# Patient Record
Sex: FEMALE | Race: WHITE | Employment: FULL TIME | URBAN - METROPOLITAN AREA
[De-identification: names, ages, dates, MRNs, and addresses within clinical notes are randomized per-mention and may not be internally consistent; named-entity substitution may affect disease eponyms.]

---

## 2019-10-15 ENCOUNTER — PROCEDURE VISIT (OUTPATIENT)
Dept: AUDIOLOGY | Age: 54
End: 2019-10-15

## 2019-10-15 DIAGNOSIS — H91.92 UNILATERAL HEARING LOSS, LEFT: Primary | ICD-10-CM

## 2019-10-15 DIAGNOSIS — H93.12 TINNITUS, LEFT: ICD-10-CM

## 2019-10-15 PROCEDURE — 99999 PR OFFICE/OUTPT VISIT,PROCEDURE ONLY: CPT | Performed by: AUDIOLOGIST

## 2020-02-03 ENCOUNTER — PROCEDURE VISIT (OUTPATIENT)
Dept: AUDIOLOGY | Age: 55
End: 2020-02-03
Payer: COMMERCIAL

## 2020-02-03 PROCEDURE — 92557 COMPREHENSIVE HEARING TEST: CPT | Performed by: AUDIOLOGIST

## 2020-02-03 PROCEDURE — 92567 TYMPANOMETRY: CPT | Performed by: AUDIOLOGIST

## 2020-02-18 ENCOUNTER — OFFICE VISIT (OUTPATIENT)
Dept: ENT CLINIC | Age: 55
End: 2020-02-18
Payer: COMMERCIAL

## 2020-02-18 VITALS — BODY MASS INDEX: 33.29 KG/M2 | WEIGHT: 195 LBS | HEIGHT: 64 IN

## 2020-02-18 PROCEDURE — G8427 DOCREV CUR MEDS BY ELIG CLIN: HCPCS | Performed by: OTOLARYNGOLOGY

## 2020-02-18 PROCEDURE — G8417 CALC BMI ABV UP PARAM F/U: HCPCS | Performed by: OTOLARYNGOLOGY

## 2020-02-18 PROCEDURE — 3017F COLORECTAL CA SCREEN DOC REV: CPT | Performed by: OTOLARYNGOLOGY

## 2020-02-18 PROCEDURE — 99203 OFFICE O/P NEW LOW 30 MIN: CPT | Performed by: OTOLARYNGOLOGY

## 2020-02-18 PROCEDURE — G8484 FLU IMMUNIZE NO ADMIN: HCPCS | Performed by: OTOLARYNGOLOGY

## 2020-02-18 PROCEDURE — 1036F TOBACCO NON-USER: CPT | Performed by: OTOLARYNGOLOGY

## 2020-02-18 RX ORDER — CHLORTHALIDONE 25 MG/1
TABLET ORAL
COMMUNITY
Start: 2020-01-25

## 2020-02-18 RX ORDER — NIFEDIPINE 30 MG/1
TABLET, EXTENDED RELEASE ORAL
COMMUNITY
Start: 2020-01-25

## 2020-02-18 RX ORDER — ATENOLOL 50 MG/1
50 TABLET ORAL
COMMUNITY
Start: 2019-11-25

## 2020-02-18 NOTE — PROGRESS NOTES
DEPARTMENT OF OTOLARYNGOLOGY   HISTORY AND PHYSICAL      PATIENT: Yohan Client : 1965 (53 y.o.)   DATE: 2020  REFERRED BY: No referring provider defined for this encounter. HISTORY OBTAINED FROM:  patient    CHIEF COMPLAINT:  had concerns including Hearing Problem (hearing problems; left for 5 years wore hearing aids; right side hearing couple of months not sure if related to 3 week headache; had ct done at [facility] was negative). HISTORY OF PRESENT ILLNESS:                                                                                        Yohan Client is a(n) 53 y.o. female with significant past medical history of left hearing loss presents to the office today as a new patient for evaluation of her right hearing loss. Pt states she has old left sided hearing loss was 5 years , already has hearing aid for this. States it began as a head cold and notes that the sound was muffled in that ear, was later told she had permanent hearing loss from this. Prior patient of a Dr. Mahi Epstein. Pt was taking an annual hearing test and noted that she had new hearing loss in the right side. Pt reports no bleeding, drainage or dizziness associated with this hearing loss. States she uses q tips to clean regularly. Beginning 1 month ago began having headaches intermittently. Described as sharp, localized from the base of the neck to the top of her head. Went to ED in January for this and CT scan was unremarkable. Nausea without vomiting, also noted scotoma in the periphery of her vision.       Past Medical History:   Diagnosis Date    Hypertension         Past Surgical History:   Procedure Laterality Date    APPENDECTOMY       SECTION           Current Outpatient Medications:     NIFEdipine (PROCARDIA XL) 30 MG extended release tablet, , Disp: , Rfl:     atenolol (TENORMIN) 50 MG tablet, Take 50 mg by mouth, Disp: , Rfl:     chlorthalidone (HYGROTON) 25 MG tablet, , Disp: , Rfl:     VENTOLIN  (90 Base) MCG/ACT inhaler, TAKE 2 PUFFS BY MOUTH EVERY 4 TO 6 HOURS AS NEEDED, Disp: , Rfl:     Allergies   Allergen Reactions    Edarbyclor [Azilsartan-Chlorthalidone]      Cant breathe    Lasix [Furosemide] Anaphylaxis       Family History   Problem Relation Age of Onset    High Blood Pressure Mother     High Cholesterol Mother     High Blood Pressure Father     High Blood Pressure Sister        Social History     Socioeconomic History    Marital status:      Spouse name: Not on file    Number of children: Not on file    Years of education: Not on file    Highest education level: Not on file   Occupational History    Not on file   Social Needs    Financial resource strain: Not on file    Food insecurity:     Worry: Not on file     Inability: Not on file    Transportation needs:     Medical: Not on file     Non-medical: Not on file   Tobacco Use    Smoking status: Never Smoker    Smokeless tobacco: Never Used   Substance and Sexual Activity    Alcohol use: Yes     Alcohol/week: 0.0 standard drinks     Comment: occasionally    Drug use: No    Sexual activity: Not on file   Lifestyle    Physical activity:     Days per week: Not on file     Minutes per session: Not on file    Stress: Not on file   Relationships    Social connections:     Talks on phone: Not on file     Gets together: Not on file     Attends Worship service: Not on file     Active member of club or organization: Not on file     Attends meetings of clubs or organizations: Not on file     Relationship status: Not on file    Intimate partner violence:     Fear of current or ex partner: Not on file     Emotionally abused: Not on file     Physically abused: Not on file     Forced sexual activity: Not on file   Other Topics Concern    Not on file   Social History Narrative    Not on file       REVIEW OF SYSTEMS:  Review of Systems   Constitutional: Negative for chills and fever. HENT: Negative for ear discharge and hearing loss. Respiratory: Negative for cough and shortness of breath. Cardiovascular: Negative for chest pain and palpitations. Gastrointestinal: Negative for vomiting. Skin: Negative for rash. Allergic/Immunologic: Negative for environmental allergies. Neurological: Negative for dizziness and headaches. Hematological: Does not bruise/bleed easily. All other systems reviewed and are negative. Constitutional: Negative for chills and fever. Eyes: Negative for discharge and itching. ENT: Negative for congestion, ear discharge, ear pain, hearing loss and sore throat. Respiratory: Negative for chest tightness and shortness of breath. Cardiovascular: Negative for chestpain and palpitations. Gastrointestinal: Negative for abdominal distention and abdominal pain. Endocrinology: Negative for heat and cold intolerance. Neurological: Negative for focal weaknessor seizure   Skin: Negative for rash and itchiness   Psychiatric: Negative for depression and hallucinations     PHYSICAL EXAM:                                                                                                                Ht 5' 4\" (1.626 m)   Wt 195 lb (88.5 kg)   LMP 02/18/2020   Breastfeeding No   BMI 33.47 kg/m²   Physical Exam  Vitals signs and nursing note reviewed. Constitutional:       Appearance: She is well-developed. HENT:      Head: Normocephalic and atraumatic. Right Ear: Tympanic membrane normal.      Left Ear: Tympanic membrane normal.      Nose: No congestion or rhinorrhea. Eyes:      Conjunctiva/sclera: Conjunctivae normal.      Pupils: Pupils are equal, round, and reactive to light. Neck:      Musculoskeletal: Normal range of motion and neck supple. Cardiovascular:      Rate and Rhythm: Normal rate. Pulmonary:      Effort: Pulmonary effort is normal. No respiratory distress. Abdominal:      General: There is no distension. Manuela  1965      I have discussed the case, including pertinent history and exam findings with the resident. I have seen and examined the patient and the key elements of the encounter have been performed by me. I agree with the assessment, plan and orders as documented by the resident. Patient here for follow up of medical problems. Remainder of medical problems as per resident note.       [facility], DO  2/19/20

## 2020-02-24 ASSESSMENT — ENCOUNTER SYMPTOMS
COUGH: 0
SHORTNESS OF BREATH: 0
VOMITING: 0

## 2020-02-28 ENCOUNTER — HOSPITAL ENCOUNTER (OUTPATIENT)
Dept: MRI IMAGING | Age: 55
Discharge: HOME | End: 2020-03-01
Payer: COMMERCIAL

## 2020-02-28 PROCEDURE — 6360000004 HC RX CONTRAST MEDICATION: Performed by: RADIOLOGY

## 2020-02-28 PROCEDURE — A9577 INJ MULTIHANCE: HCPCS | Performed by: RADIOLOGY

## 2020-02-28 PROCEDURE — 70553 MRI BRAIN STEM W/O & W/DYE: CPT

## 2020-02-28 RX ADMIN — GADOBENATE DIMEGLUMINE 18 ML: 529 INJECTION, SOLUTION INTRAVENOUS at 07:49

## 2020-08-21 ENCOUNTER — OFFICE VISIT (OUTPATIENT)
Dept: ENT CLINIC | Age: 55
End: 2020-08-21
Payer: COMMERCIAL

## 2020-08-21 ENCOUNTER — PROCEDURE VISIT (OUTPATIENT)
Dept: AUDIOLOGY | Age: 55
End: 2020-08-21
Payer: COMMERCIAL

## 2020-08-21 VITALS
BODY MASS INDEX: 34.31 KG/M2 | HEIGHT: 64 IN | DIASTOLIC BLOOD PRESSURE: 70 MMHG | SYSTOLIC BLOOD PRESSURE: 122 MMHG | WEIGHT: 201 LBS

## 2020-08-21 PROCEDURE — 1036F TOBACCO NON-USER: CPT | Performed by: OTOLARYNGOLOGY

## 2020-08-21 PROCEDURE — 99213 OFFICE O/P EST LOW 20 MIN: CPT | Performed by: OTOLARYNGOLOGY

## 2020-08-21 PROCEDURE — G8417 CALC BMI ABV UP PARAM F/U: HCPCS | Performed by: OTOLARYNGOLOGY

## 2020-08-21 PROCEDURE — 92556 SPEECH AUDIOMETRY COMPLETE: CPT | Performed by: AUDIOLOGIST

## 2020-08-21 PROCEDURE — 3017F COLORECTAL CA SCREEN DOC REV: CPT | Performed by: OTOLARYNGOLOGY

## 2020-08-21 PROCEDURE — 92567 TYMPANOMETRY: CPT | Performed by: AUDIOLOGIST

## 2020-08-21 PROCEDURE — 92552 PURE TONE AUDIOMETRY AIR: CPT | Performed by: AUDIOLOGIST

## 2020-08-21 PROCEDURE — G8427 DOCREV CUR MEDS BY ELIG CLIN: HCPCS | Performed by: OTOLARYNGOLOGY

## 2020-08-21 NOTE — LETTER
[facility]  [address] [place] [id number]  Phone: [phone]  Fax: [address] [address] [facility],         August 21, 2020     Patient: Jesusita Guerrero   YOB: 1965   Date of Visit: 8/21/2020       To Whom it May Concern:    Jesusita Guerrero was seen in my clinic on 8/21/2020. If you have any questions or concerns, please don't hesitate to call.     Sincerely,         Juanita Calvo, DO

## 2020-08-21 NOTE — PROGRESS NOTES
This patient was referred for audiometric/tympanometric testing by Dr. Marium Rasheed due to a unilateral sensorineural hearing loss, left ear. Patient is being seen for her 6-month ENT/Audiology consult. Audiometry revealed a mild hearing loss, right ear and a moderately-severe-to-severe hearing loss, left ear. Reliability was good. Speech reception thresholds were in good agreement with the pure tone averages, in the right ear. Patient reported only feeling vibration, left ear, during speech testing. Speech discrimination scores were 100%, right ear at 55dBHL and unable to be tested, left ear. Tympanometry revealed normal middle ear peak pressure and compliance, bilaterally. Ipsilateral acoustic reflexes were present, right ear and unable to be tested, lefte ar at 1000Hz. The results were reviewed with the patient. Recommendations for follow up will be made pending physician consult.     Mell Macias, [facility]

## 2020-08-21 NOTE — PROGRESS NOTES
DEPARTMENT OF OTOLARYNGOLOGY   HISTORY AND PHYSICAL      PATIENT: Melchor Rodríguez : 1965 (53 y.o.)   DATE: 2020  REFERRED BY: No referring provider defined for this encounter. HISTORY OBTAINED FROM:  patient    CHIEF COMPLAINT:  had concerns including Ear Problem (6 mo follow up hearing). HISTORY OF PRESENT ILLNESS:            Melchor Rodríguez is a(n) 53 y.o. female with significant past medical history of left hearing loss presents to the office today as a new patient for evaluation of her left hearing loss. Pt states she has old left sided hearing loss was 5 years , already has hearing aid for this. States it began as a head cold and notes that the sound was muffled in that ear, was later told she had permanent hearing loss from this. Prior patient of a Dr. Wicho Spangler. Pt was taking an annual hearing test and noted that she had new hearing loss in the right side. Pt reports no bleeding, drainage or dizziness associated with this hearing loss. States she uses q tips to clean regularly. No recent problems with headaches.     Past Medical History:   Diagnosis Date    Hypertension         Past Surgical History:   Procedure Laterality Date    APPENDECTOMY  1968     SECTION           Current Outpatient Medications:     NIFEdipine (PROCARDIA XL) 30 MG extended release tablet, , Disp: , Rfl:     chlorthalidone (HYGROTON) 25 MG tablet, , Disp: , Rfl:     VENTOLIN  (90 Base) MCG/ACT inhaler, TAKE 2 PUFFS BY MOUTH EVERY 4 TO 6 HOURS AS NEEDED, Disp: , Rfl:     atenolol (TENORMIN) 50 MG tablet, Take 50 mg by mouth, Disp: , Rfl:     Allergies   Allergen Reactions    Edarbyclor [Azilsartan-Chlorthalidone]      Cant breathe    Lasix [Furosemide] Anaphylaxis       Family History   Problem Relation Age of Onset    High Blood Pressure Mother     High Cholesterol Mother     High Blood Pressure Father     High Blood Pressure Sister        Social History Socioeconomic History    Marital status:      Spouse name: Not on file    Number of children: Not on file    Years of education: Not on file    Highest education level: Not on file   Occupational History    Not on file   Social Needs    Financial resource strain: Not on file    Food insecurity     Worry: Not on file     Inability: Not on file    Transportation needs     Medical: Not on file     Non-medical: Not on file   Tobacco Use    Smoking status: Never Smoker    Smokeless tobacco: Never Used   Substance and Sexual Activity    Alcohol use: Yes     Alcohol/week: 0.0 standard drinks     Comment: occasionally    Drug use: No    Sexual activity: Not on file   Lifestyle    Physical activity     Days per week: Not on file     Minutes per session: Not on file    Stress: Not on file   Relationships    Social connections     Talks on phone: Not on file     Gets together: Not on file     Attends Mu-ism service: Not on file     Active member of club or organization: Not on file     Attends meetings of clubs or organizations: Not on file     Relationship status: Not on file    Intimate partner violence     Fear of current or ex partner: Not on file     Emotionally abused: Not on file     Physically abused: Not on file     Forced sexual activity: Not on file   Other Topics Concern    Not on file   Social History Narrative    Not on file       REVIEW OF SYSTEMS:  Review of Systems   Constitutional: Negative for chills and fever. HENT: Negative for ear discharge and hearing loss. Respiratory: Negative for cough and shortness of breath. Cardiovascular: Negative for chest pain and palpitations. Gastrointestinal: Negative for vomiting. Skin: Negative for rash. Allergic/Immunologic: Negative for environmental allergies. Neurological: Negative for dizziness and headaches. Hematological: Does not bruise/bleed easily. All other systems reviewed and are negative.     Constitutional: Negative for chills and fever. Eyes: Negative for discharge and itching. ENT: Negative for congestion, ear discharge, ear pain, hearing loss and sore throat. Respiratory: Negative for chest tightness and shortness of breath. Cardiovascular: Negative for chestpain and palpitations. Gastrointestinal: Negative for abdominal distention and abdominal pain. Endocrinology: Negative for heat and cold intolerance. Neurological: Negative for focal weaknessor seizure   Skin: Negative for rash and itchiness   Psychiatric: Negative for depression and hallucinations     PHYSICAL EXAM:                                                                                                                /70 (Site: Left Upper Arm, Position: Sitting, Cuff Size: Medium Adult)   Ht 5' 4\" (1.626 m)   Wt 201 lb (91.2 kg)   BMI 34.50 kg/m²   Physical Exam  Vitals signs and nursing note reviewed. Constitutional:       Appearance: She is well-developed. HENT:      Head: Normocephalic and atraumatic. Right Ear: Tympanic membrane normal.      Left Ear: Tympanic membrane normal.      Nose: No congestion or rhinorrhea. Eyes:      Conjunctiva/sclera: Conjunctivae normal.      Pupils: Pupils are equal, round, and reactive to light. Neck:      Musculoskeletal: Normal range of motion and neck supple. Cardiovascular:      Rate and Rhythm: Normal rate. Pulmonary:      Effort: Pulmonary effort is normal. No respiratory distress. Abdominal:      General: There is no distension. Tenderness: There is no abdominal tenderness. There is no guarding. Musculoskeletal: Normal range of motion. Skin:     General: Skin is warm and dry. Neurological:      Mental Status: She is alert and oriented to person, place, and time. Psychiatric:         Behavior: Behavior normal.         Thought Content:  Thought content normal.         Judgment: Judgment normal.       Constitutional: Appears well-developed and well-nourished. Appears as stated age. Eyes: Lids and lashes normal, pupils equal, extra ocular muscles intact, sclera clear   ENT: sinuses nontender on palpation, external ears without lesions, tympanic membranes intact bilaterally, Nares normal, septum midline, no lesions, no drainage, tonsils without erythema or exudates   Neck:  Supple, symmetrical,trachea midline, no adenopathy, thyroid symmetric, not enlarged and no tenderness, skin normal   Respiratory: No increased work of breathing. No stridor or wheezes   Cardiovascular: Regular rate   Skin: Warm and dry   Neurologic:  Alert and oriented     ASSESSMENTAND PLAN:                                                                                                   53 y.o. female presents with chronic hearing loss     · Approved for a new hearing aid. · Left ear has profound hearing deficit, would likely NOT benefit from hearing aid, but she can try if she likes. Would be a good BAHA candidate however the patient was not interested in this surgical option  · Right ear has very mild hearing loss  · Recommend annual hearing check  · Wear hearing protection  · Avoid loud noise environment  · Follow up in 6 month(s) or sooner if symptoms acutely exacerbate    Patient was seen and examined with attending physician, who agrees with the assessment andplans. Tigre Nina S IV  8/21/2020          Theron Landry  1965      I have discussed the case, including pertinent history and exam findings with the resident. I have seen and examined the patient and the key elements of the encounter have been performed by me. I agree with the assessment, plan and orders as documented by the resident. Patient here for follow up of medical problems. Remainder of medical problems as per resident note.       [address] [facility], DO  8/29/20

## 2021-01-14 ENCOUNTER — PROCEDURE VISIT (OUTPATIENT)
Dept: AUDIOLOGY | Age: 56
End: 2021-01-14

## 2021-01-14 DIAGNOSIS — H90.A22 SENSORINEURAL HEARING LOSS, UNILATERAL, LEFT EAR, WITH RESTRICTED HEARING ON THE CONTRALATERAL SIDE: ICD-10-CM

## 2021-01-14 PROCEDURE — 99999 PR OFFICE/OUTPT VISIT,PROCEDURE ONLY: CPT | Performed by: AUDIOLOGIST

## 2021-01-14 NOTE — PROGRESS NOTES
Patient seen foe hearing aid evaluation. Due to insurance changes, patient was advised to contact her insurance provider to see if she still has hearing aid benefits and if she is able to come to this facility, for her hearing aid. Patient rescheduled to return in one week and will provide insurance information, at that time.

## 2022-10-10 ENCOUNTER — HOSPITAL ENCOUNTER (EMERGENCY)
Age: 57
Discharge: HOME | End: 2022-10-10
Attending: EMERGENCY MEDICINE
Payer: COMMERCIAL

## 2022-10-10 ENCOUNTER — APPOINTMENT (OUTPATIENT)
Dept: CT IMAGING | Age: 57
End: 2022-10-10
Payer: COMMERCIAL

## 2022-10-10 VITALS
BODY MASS INDEX: 34.31 KG/M2 | SYSTOLIC BLOOD PRESSURE: 126 MMHG | TEMPERATURE: 98.1 F | HEIGHT: 64 IN | WEIGHT: 201 LBS | OXYGEN SATURATION: 96 % | HEART RATE: 65 BPM | DIASTOLIC BLOOD PRESSURE: 70 MMHG | RESPIRATION RATE: 23 BRPM

## 2022-10-10 DIAGNOSIS — Q44.6 POLYCYSTIC LIVER DISEASE: Primary | ICD-10-CM

## 2022-10-10 DIAGNOSIS — N30.00 ACUTE CYSTITIS WITHOUT HEMATURIA: ICD-10-CM

## 2022-10-10 DIAGNOSIS — N88.9 CERVICAL LESION: ICD-10-CM

## 2022-10-10 LAB
ALBUMIN SERPL-MCNC: 4.1 G/DL (ref 3.5–5.2)
ALP BLD-CCNC: 72 U/L (ref 35–104)
ALT SERPL-CCNC: 17 U/L (ref 0–32)
ANION GAP SERPL CALCULATED.3IONS-SCNC: 12 MMOL/L (ref 7–16)
AST SERPL-CCNC: 17 U/L (ref 0–31)
BACTERIA: ABNORMAL /HPF
BASOPHILS ABSOLUTE: 0.03 E9/L (ref 0–0.2)
BASOPHILS RELATIVE PERCENT: 0.4 % (ref 0–2)
BILIRUB SERPL-MCNC: 0.3 MG/DL (ref 0–1.2)
BILIRUBIN URINE: NEGATIVE
BLOOD, URINE: NEGATIVE
BUN BLDV-MCNC: 17 MG/DL (ref 6–20)
CALCIUM SERPL-MCNC: 9.6 MG/DL (ref 8.6–10.2)
CHLORIDE BLD-SCNC: 99 MMOL/L (ref 98–107)
CLARITY: ABNORMAL
CO2: 27 MMOL/L (ref 22–29)
COLOR: YELLOW
CREAT SERPL-MCNC: 0.7 MG/DL (ref 0.5–1)
EOSINOPHILS ABSOLUTE: 0.15 E9/L (ref 0.05–0.5)
EOSINOPHILS RELATIVE PERCENT: 2 % (ref 0–6)
EPITHELIAL CELLS, UA: ABNORMAL /HPF
GFR AFRICAN AMERICAN: >60
GFR NON-AFRICAN AMERICAN: >60 ML/MIN/1.73
GLUCOSE BLD-MCNC: 110 MG/DL (ref 74–99)
GLUCOSE URINE: NEGATIVE MG/DL
HCT VFR BLD CALC: 36.1 % (ref 34–48)
HEMOGLOBIN: 11.6 G/DL (ref 11.5–15.5)
IMMATURE GRANULOCYTES #: 0.02 E9/L
IMMATURE GRANULOCYTES %: 0.3 % (ref 0–5)
KETONES, URINE: NEGATIVE MG/DL
LACTIC ACID: 1.2 MMOL/L (ref 0.5–2.2)
LEUKOCYTE ESTERASE, URINE: ABNORMAL
LIPASE: 27 U/L (ref 13–60)
LYMPHOCYTES ABSOLUTE: 1.56 E9/L (ref 1.5–4)
LYMPHOCYTES RELATIVE PERCENT: 21.1 % (ref 20–42)
MAGNESIUM: 1.9 MG/DL (ref 1.6–2.6)
MCH RBC QN AUTO: 25.4 PG (ref 26–35)
MCHC RBC AUTO-ENTMCNC: 32.1 % (ref 32–34.5)
MCV RBC AUTO: 79 FL (ref 80–99.9)
MONOCYTES ABSOLUTE: 0.62 E9/L (ref 0.1–0.95)
MONOCYTES RELATIVE PERCENT: 8.4 % (ref 2–12)
NEUTROPHILS ABSOLUTE: 5 E9/L (ref 1.8–7.3)
NEUTROPHILS RELATIVE PERCENT: 67.8 % (ref 43–80)
NITRITE, URINE: NEGATIVE
PDW BLD-RTO: 14.2 FL (ref 11.5–15)
PH UA: 6 (ref 5–9)
PLATELET # BLD: 357 E9/L (ref 130–450)
PMV BLD AUTO: 9.2 FL (ref 7–12)
POTASSIUM REFLEX MAGNESIUM: 3 MMOL/L (ref 3.5–5)
PROTEIN UA: NEGATIVE MG/DL
RBC # BLD: 4.57 E12/L (ref 3.5–5.5)
RBC UA: ABNORMAL /HPF (ref 0–2)
SODIUM BLD-SCNC: 138 MMOL/L (ref 132–146)
SPECIFIC GRAVITY UA: 1.02 (ref 1–1.03)
TOTAL PROTEIN: 7.9 G/DL (ref 6.4–8.3)
UROBILINOGEN, URINE: 0.2 E.U./DL
WBC # BLD: 7.4 E9/L (ref 4.5–11.5)
WBC UA: ABNORMAL /HPF (ref 0–5)

## 2022-10-10 PROCEDURE — 6370000000 HC RX 637 (ALT 250 FOR IP): Performed by: EMERGENCY MEDICINE

## 2022-10-10 PROCEDURE — 96374 THER/PROPH/DIAG INJ IV PUSH: CPT

## 2022-10-10 PROCEDURE — 80053 COMPREHEN METABOLIC PANEL: CPT

## 2022-10-10 PROCEDURE — 81001 URINALYSIS AUTO W/SCOPE: CPT

## 2022-10-10 PROCEDURE — 99284 EMERGENCY DEPT VISIT MOD MDM: CPT

## 2022-10-10 PROCEDURE — 83605 ASSAY OF LACTIC ACID: CPT

## 2022-10-10 PROCEDURE — 83735 ASSAY OF MAGNESIUM: CPT

## 2022-10-10 PROCEDURE — 74176 CT ABD & PELVIS W/O CONTRAST: CPT

## 2022-10-10 PROCEDURE — 83690 ASSAY OF LIPASE: CPT

## 2022-10-10 PROCEDURE — 6360000002 HC RX W HCPCS: Performed by: EMERGENCY MEDICINE

## 2022-10-10 PROCEDURE — 85025 COMPLETE CBC W/AUTO DIFF WBC: CPT

## 2022-10-10 RX ORDER — POTASSIUM CHLORIDE 20 MEQ/1
40 TABLET, EXTENDED RELEASE ORAL ONCE
Status: COMPLETED | OUTPATIENT
Start: 2022-10-10 | End: 2022-10-10

## 2022-10-10 RX ORDER — CEFDINIR 300 MG/1
300 CAPSULE ORAL 2 TIMES DAILY
Qty: 14 CAPSULE | Refills: 0 | Status: SHIPPED | OUTPATIENT
Start: 2022-10-10 | End: 2022-10-17

## 2022-10-10 RX ORDER — CEFDINIR 300 MG/1
300 CAPSULE ORAL EVERY 12 HOURS SCHEDULED
Status: DISCONTINUED | OUTPATIENT
Start: 2022-10-10 | End: 2022-10-10 | Stop reason: HOSPADM

## 2022-10-10 RX ORDER — KETOROLAC TROMETHAMINE 15 MG/ML
15 INJECTION, SOLUTION INTRAMUSCULAR; INTRAVENOUS ONCE
Status: COMPLETED | OUTPATIENT
Start: 2022-10-10 | End: 2022-10-10

## 2022-10-10 RX ADMIN — POTASSIUM CHLORIDE 40 MEQ: 1500 TABLET, EXTENDED RELEASE ORAL at 06:31

## 2022-10-10 RX ADMIN — KETOROLAC TROMETHAMINE 15 MG: 15 INJECTION, SOLUTION INTRAMUSCULAR; INTRAVENOUS at 05:06

## 2022-10-10 ASSESSMENT — PAIN - FUNCTIONAL ASSESSMENT
PAIN_FUNCTIONAL_ASSESSMENT: 0-10
PAIN_FUNCTIONAL_ASSESSMENT: 0-10

## 2022-10-10 ASSESSMENT — LIFESTYLE VARIABLES
HOW MANY STANDARD DRINKS CONTAINING ALCOHOL DO YOU HAVE ON A TYPICAL DAY: PATIENT DOES NOT DRINK
HOW OFTEN DO YOU HAVE A DRINK CONTAINING ALCOHOL: NEVER

## 2022-10-10 ASSESSMENT — PAIN DESCRIPTION - ORIENTATION
ORIENTATION: RIGHT

## 2022-10-10 ASSESSMENT — PAIN SCALES - GENERAL
PAINLEVEL_OUTOF10: 10
PAINLEVEL_OUTOF10: 10

## 2022-10-10 ASSESSMENT — PAIN DESCRIPTION - LOCATION
LOCATION: FLANK
LOCATION: FLANK;BACK
LOCATION: FLANK

## 2022-10-10 ASSESSMENT — PAIN DESCRIPTION - FREQUENCY: FREQUENCY: CONTINUOUS

## 2022-10-10 ASSESSMENT — PAIN DESCRIPTION - DESCRIPTORS: DESCRIPTORS: ACHING

## 2022-10-10 NOTE — ED PROVIDER NOTES
HPI:  10/10/22,   Time: 4:40 AM EDT         Laine Prajapati is a 61 y.o. female presenting to the ED for right flank pain, beginning 2 weeks ago. The complaint has been persistent, mild in severity, and worsened by nothing. Patient has been having a sharp and stabbing sensation in the right flank for the last 2 weeks. Radiates into the RLQ. No alleviating or exacerbating factors. She did follow-up with her primary care physician who said she had a urinary tract infection. She was started on Cipro. She took 100 mg for 7 days. She followed up again and was started on 500 mg of Cipro. She has been on this 2 days. She denies any fever, chills, dysuria, hematuria, increased urgency, increased frequency, diarrhea, constipation. History of previous appendectomy and . ROS:   Pertinent positives and negatives are stated within HPI, all other systems reviewed and are negative.  --------------------------------------------- PAST HISTORY ---------------------------------------------  Past Medical History:  has a past medical history of Hypertension. Past Surgical History:  has a past surgical history that includes  section () and Appendectomy (). Social History:  reports that she has never smoked. She has never used smokeless tobacco. She reports current alcohol use. She reports that she does not use drugs. Family History: family history includes High Blood Pressure in her father, mother, and sister; High Cholesterol in her mother. The patients home medications have been reviewed.     Allergies: Edarbyclor [azilsartan-chlorthalidone] and Lasix [furosemide]    -------------------------------------------------- RESULTS -------------------------------------------------  All laboratory and radiology results have been personally reviewed by myself   LABS:  Results for orders placed or performed during the hospital encounter of 10/10/22   CBC with Auto Differential   Result Value Ref Range    WBC 7.4 4.5 - 11.5 E9/L    RBC 4.57 3.50 - 5.50 E12/L    Hemoglobin 11.6 11.5 - 15.5 g/dL    Hematocrit 36.1 34.0 - 48.0 %    MCV 79.0 (L) 80.0 - 99.9 fL    MCH 25.4 (L) 26.0 - 35.0 pg    MCHC 32.1 32.0 - 34.5 %    RDW 14.2 11.5 - 15.0 fL    Platelets 651 617 - 770 E9/L    MPV 9.2 7.0 - 12.0 fL    Neutrophils % 67.8 43.0 - 80.0 %    Immature Granulocytes % 0.3 0.0 - 5.0 %    Lymphocytes % 21.1 20.0 - 42.0 %    Monocytes % 8.4 2.0 - 12.0 %    Eosinophils % 2.0 0.0 - 6.0 %    Basophils % 0.4 0.0 - 2.0 %    Neutrophils Absolute 5.00 1.80 - 7.30 E9/L    Immature Granulocytes # 0.02 E9/L    Lymphocytes Absolute 1.56 1.50 - 4.00 E9/L    Monocytes Absolute 0.62 0.10 - 0.95 E9/L    Eosinophils Absolute 0.15 0.05 - 0.50 E9/L    Basophils Absolute 0.03 0.00 - 0.20 E9/L   Comprehensive Metabolic Panel w/ Reflex to MG   Result Value Ref Range    Sodium 138 132 - 146 mmol/L    Potassium reflex Magnesium 3.0 (L) 3.5 - 5.0 mmol/L    Chloride 99 98 - 107 mmol/L    CO2 27 22 - 29 mmol/L    Anion Gap 12 7 - 16 mmol/L    Glucose 110 (H) 74 - 99 mg/dL    BUN 17 6 - 20 mg/dL    Creatinine 0.7 0.5 - 1.0 mg/dL    GFR Non-African American >60 >=60 mL/min/1.73    GFR African American >60     Calcium 9.6 8.6 - 10.2 mg/dL    Total Protein 7.9 6.4 - 8.3 g/dL    Albumin 4.1 3.5 - 5.2 g/dL    Total Bilirubin 0.3 0.0 - 1.2 mg/dL    Alkaline Phosphatase 72 35 - 104 U/L    ALT 17 0 - 32 U/L    AST 17 0 - 31 U/L   Lipase   Result Value Ref Range    Lipase 27 13 - 60 U/L   Lactic Acid   Result Value Ref Range    Lactic Acid 1.2 0.5 - 2.2 mmol/L   Urinalysis with Microscopic   Result Value Ref Range    Color, UA Yellow Straw/Yellow    Clarity, UA SLCLOUDY Clear    Glucose, Ur Negative Negative mg/dL    Bilirubin Urine Negative Negative    Ketones, Urine Negative Negative mg/dL    Specific Gravity, UA 1.020 1.005 - 1.030    Blood, Urine Negative Negative    pH, UA 6.0 5.0 - 9.0    Protein, UA Negative Negative mg/dL    Urobilinogen, Urine 0.2 <2.0 E.U./dL    Nitrite, Urine Negative Negative    Leukocyte Esterase, Urine TRACE (A) Negative    WBC, UA 2-5 0 - 5 /HPF    RBC, UA NONE 0 - 2 /HPF    Epithelial Cells, UA MANY /HPF    Bacteria, UA MANY (A) None Seen /HPF   Magnesium   Result Value Ref Range    Magnesium 1.9 1.6 - 2.6 mg/dL       RADIOLOGY:  Interpreted by Radiologist.  CT ABDOMEN PELVIS WO CONTRAST Additional Contrast? None   Final Result   A low attenuation lesion measuring 4.3 cm in the lower uterine segment or   cervix. Consider follow up with a pelvic US      Polycystic liver disease. Numerous cystic structures throughout both kidneys. ------------------------- NURSING NOTES AND VITALS REVIEWED ---------------------------   The nursing notes within the ED encounter and vital signs as below have been reviewed. /70   Pulse 65   Temp 98.1 °F (36.7 °C) (Oral)   Resp 23   Ht 5' 4\" (1.626 m)   Wt 201 lb (91.2 kg)   SpO2 96%   BMI 34.50 kg/m²   Oxygen Saturation Interpretation: Normal      ---------------------------------------------------PHYSICAL EXAM--------------------------------------      Constitutional/General: Alert and oriented x3, well appearing, non toxic in NAD  Head: NC/AT  Eyes: PERRL, EOMI  Mouth: Oropharynx clear, handling secretions, no trismus  Neck: Supple, full ROM, no meningeal signs  Pulmonary: Lungs clear to auscultation bilaterally, no wheezes, rales, or rhonchi. Not in respiratory distress  Cardiovascular:  Regular rate and rhythm, no murmurs, gallops, or rubs. 2+ distal pulses  Abdomen: Soft, tenderness to palpation in the right flank, right lower quadrant. Extremities: Moves all extremities x 4.  Warm and well perfused  Skin: warm and dry without rash  Neurologic: GCS 15,  Psych: Normal Affect      ------------------------------ ED COURSE/MEDICAL DECISION MAKING----------------------  Medications   potassium chloride (KLOR-CON M) extended release tablet 40 mEq (has no administration in time range)   cefdinir (OMNICEF) capsule 300 mg (has no administration in time range)   ketorolac (TORADOL) injection 15 mg (15 mg IntraVENous Given 10/10/22 8756)         Medical Decision Making:    Labs and imaging obtained. WBC is normal.  Potassium of 3.0. Patient given potassium and chloride. Lipase is normal.  Lactic acid is normal.  Urinalysis shows signs of infection. Urine culture obtained. CT abdomen pelvis shows a low-attenuation lesion measuring 4.3 cm in the lower uterine segment or cervix. Pelvic ultrasound recommended. Polycystic liver disease was appreciated. Numerous cystic structures throughout both kidneys. Patient is currently on ciprofloxacin. She says that she was on this medication for the last week. I told the patient to discontinue this medication. I will start her on Omnicef. I discussed the lesion on the cervix with the patient. Unable to obtain a pelvic ultrasound at this time. Patient feels comfortable with discharge home and following up with her primary care physician and OB/GYN regarding the cervical lesion. Counseling: The emergency provider has spoken with the patient and discussed todays results, in addition to providing specific details for the plan of care and counseling regarding the diagnosis and prognosis. Questions are answered at this time and they are agreeable with the plan.      --------------------------------- IMPRESSION AND DISPOSITION ---------------------------------    IMPRESSION  1. Polycystic liver disease    2. Acute cystitis without hematuria    3.  Cervical lesion        DISPOSITION  Disposition: Discharge to home  Patient condition is stable                  Jaylen Bond MD  10/10/22 [id number]

## 2022-12-28 ENCOUNTER — APPOINTMENT (OUTPATIENT)
Dept: CT IMAGING | Age: 57
End: 2022-12-28
Payer: COMMERCIAL

## 2022-12-28 ENCOUNTER — HOSPITAL ENCOUNTER (EMERGENCY)
Age: 57
Discharge: ANOTHER HOSPITAL | End: 2022-12-29
Attending: EMERGENCY MEDICINE
Payer: COMMERCIAL

## 2022-12-28 ENCOUNTER — APPOINTMENT (OUTPATIENT)
Dept: GENERAL RADIOLOGY | Age: 57
End: 2022-12-28
Payer: COMMERCIAL

## 2022-12-28 DIAGNOSIS — I60.9 SUBARACHNOID HEMORRHAGE (HCC): Primary | ICD-10-CM

## 2022-12-28 DIAGNOSIS — I67.1 BRAIN ANEURYSM: ICD-10-CM

## 2022-12-28 LAB
ALBUMIN SERPL-MCNC: 4.4 G/DL (ref 3.5–5.2)
ALP BLD-CCNC: 76 U/L (ref 35–104)
ALT SERPL-CCNC: 28 U/L (ref 0–32)
ANION GAP SERPL CALCULATED.3IONS-SCNC: 10 MMOL/L (ref 7–16)
AST SERPL-CCNC: 38 U/L (ref 0–31)
BASOPHILS ABSOLUTE: 0.04 E9/L (ref 0–0.2)
BASOPHILS RELATIVE PERCENT: 0.3 % (ref 0–2)
BILIRUB SERPL-MCNC: 0.3 MG/DL (ref 0–1.2)
BUN BLDV-MCNC: 18 MG/DL (ref 6–20)
CALCIUM SERPL-MCNC: 10.2 MG/DL (ref 8.6–10.2)
CHLORIDE BLD-SCNC: 96 MMOL/L (ref 98–107)
CO2: 33 MMOL/L (ref 22–29)
CREAT SERPL-MCNC: 0.7 MG/DL (ref 0.5–1)
EOSINOPHILS ABSOLUTE: 0.03 E9/L (ref 0.05–0.5)
EOSINOPHILS RELATIVE PERCENT: 0.2 % (ref 0–6)
GFR SERPL CREATININE-BSD FRML MDRD: >60 ML/MIN/1.73
GLUCOSE BLD-MCNC: 111 MG/DL (ref 74–99)
HCT VFR BLD CALC: 37.6 % (ref 34–48)
HEMOGLOBIN: 11.8 G/DL (ref 11.5–15.5)
IMMATURE GRANULOCYTES #: 0.06 E9/L
IMMATURE GRANULOCYTES %: 0.5 % (ref 0–5)
LYMPHOCYTES ABSOLUTE: 1.46 E9/L (ref 1.5–4)
LYMPHOCYTES RELATIVE PERCENT: 11.1 % (ref 20–42)
MCH RBC QN AUTO: 25.6 PG (ref 26–35)
MCHC RBC AUTO-ENTMCNC: 31.4 % (ref 32–34.5)
MCV RBC AUTO: 81.6 FL (ref 80–99.9)
MONOCYTES ABSOLUTE: 0.53 E9/L (ref 0.1–0.95)
MONOCYTES RELATIVE PERCENT: 4 % (ref 2–12)
NEUTROPHILS ABSOLUTE: 11.07 E9/L (ref 1.8–7.3)
NEUTROPHILS RELATIVE PERCENT: 83.9 % (ref 43–80)
PDW BLD-RTO: 13.7 FL (ref 11.5–15)
PLATELET # BLD: 313 E9/L (ref 130–450)
PMV BLD AUTO: 9.4 FL (ref 7–12)
POTASSIUM REFLEX MAGNESIUM: 4.3 MMOL/L (ref 3.5–5)
RBC # BLD: 4.61 E12/L (ref 3.5–5.5)
REASON FOR REJECTION: NORMAL
REJECTED TEST: NORMAL
SODIUM BLD-SCNC: 139 MMOL/L (ref 132–146)
TOTAL PROTEIN: 8.3 G/DL (ref 6.4–8.3)
TROPONIN, HIGH SENSITIVITY: 8 NG/L (ref 0–9)
WBC # BLD: 13.2 E9/L (ref 4.5–11.5)

## 2022-12-28 PROCEDURE — 96375 TX/PRO/DX INJ NEW DRUG ADDON: CPT

## 2022-12-28 PROCEDURE — 70496 CT ANGIOGRAPHY HEAD: CPT

## 2022-12-28 PROCEDURE — 70498 CT ANGIOGRAPHY NECK: CPT

## 2022-12-28 PROCEDURE — 87635 SARS-COV-2 COVID-19 AMP PRB: CPT

## 2022-12-28 PROCEDURE — 99285 EMERGENCY DEPT VISIT HI MDM: CPT

## 2022-12-28 PROCEDURE — 84484 ASSAY OF TROPONIN QUANT: CPT

## 2022-12-28 PROCEDURE — 6360000004 HC RX CONTRAST MEDICATION: Performed by: RADIOLOGY

## 2022-12-28 PROCEDURE — 85025 COMPLETE CBC W/AUTO DIFF WBC: CPT

## 2022-12-28 PROCEDURE — 6370000000 HC RX 637 (ALT 250 FOR IP): Performed by: EMERGENCY MEDICINE

## 2022-12-28 PROCEDURE — 71045 X-RAY EXAM CHEST 1 VIEW: CPT

## 2022-12-28 PROCEDURE — 93005 ELECTROCARDIOGRAM TRACING: CPT | Performed by: STUDENT IN AN ORGANIZED HEALTH CARE EDUCATION/TRAINING PROGRAM

## 2022-12-28 PROCEDURE — 36415 COLL VENOUS BLD VENIPUNCTURE: CPT

## 2022-12-28 PROCEDURE — 6360000002 HC RX W HCPCS: Performed by: STUDENT IN AN ORGANIZED HEALTH CARE EDUCATION/TRAINING PROGRAM

## 2022-12-28 PROCEDURE — 6370000000 HC RX 637 (ALT 250 FOR IP): Performed by: STUDENT IN AN ORGANIZED HEALTH CARE EDUCATION/TRAINING PROGRAM

## 2022-12-28 PROCEDURE — 96374 THER/PROPH/DIAG INJ IV PUSH: CPT

## 2022-12-28 PROCEDURE — 80053 COMPREHEN METABOLIC PANEL: CPT

## 2022-12-28 RX ORDER — MORPHINE SULFATE 2 MG/ML
2 INJECTION, SOLUTION INTRAMUSCULAR; INTRAVENOUS ONCE
Status: COMPLETED | OUTPATIENT
Start: 2022-12-28 | End: 2022-12-28

## 2022-12-28 RX ORDER — DEXAMETHASONE SODIUM PHOSPHATE 4 MG/ML
2 INJECTION, SOLUTION INTRA-ARTICULAR; INTRALESIONAL; INTRAMUSCULAR; INTRAVENOUS; SOFT TISSUE ONCE
Status: COMPLETED | OUTPATIENT
Start: 2022-12-29 | End: 2022-12-29

## 2022-12-28 RX ORDER — HYDRALAZINE HYDROCHLORIDE 20 MG/ML
10 INJECTION INTRAMUSCULAR; INTRAVENOUS ONCE
Status: DISCONTINUED | OUTPATIENT
Start: 2022-12-29 | End: 2022-12-29

## 2022-12-28 RX ORDER — LEVETIRACETAM 500 MG/1
500 TABLET ORAL ONCE
Status: COMPLETED | OUTPATIENT
Start: 2022-12-29 | End: 2022-12-29

## 2022-12-28 RX ORDER — ONDANSETRON 2 MG/ML
4 INJECTION INTRAMUSCULAR; INTRAVENOUS ONCE
Status: COMPLETED | OUTPATIENT
Start: 2022-12-28 | End: 2022-12-28

## 2022-12-28 RX ORDER — ACETAMINOPHEN 325 MG/1
650 TABLET ORAL EVERY 6 HOURS PRN
Status: DISCONTINUED | OUTPATIENT
Start: 2022-12-28 | End: 2022-12-29 | Stop reason: HOSPADM

## 2022-12-28 RX ORDER — HYDROXYZINE PAMOATE 25 MG/1
25 CAPSULE ORAL ONCE
Status: COMPLETED | OUTPATIENT
Start: 2022-12-28 | End: 2022-12-28

## 2022-12-28 RX ADMIN — ACETAMINOPHEN 650 MG: 325 TABLET ORAL at 21:31

## 2022-12-28 RX ADMIN — IOPAMIDOL 75 ML: 755 INJECTION, SOLUTION INTRAVENOUS at 21:08

## 2022-12-28 RX ADMIN — HYDROXYZINE PAMOATE 25 MG: 25 CAPSULE ORAL at 19:28

## 2022-12-28 RX ADMIN — ONDANSETRON 4 MG: 2 INJECTION INTRAMUSCULAR; INTRAVENOUS at 19:28

## 2022-12-28 RX ADMIN — MORPHINE SULFATE 2 MG: 2 INJECTION, SOLUTION INTRAMUSCULAR; INTRAVENOUS at 22:46

## 2022-12-28 ASSESSMENT — PAIN DESCRIPTION - PAIN TYPE
TYPE: ACUTE PAIN

## 2022-12-28 ASSESSMENT — PAIN - FUNCTIONAL ASSESSMENT
PAIN_FUNCTIONAL_ASSESSMENT: INTOLERABLE, UNABLE TO DO ANY ACTIVE OR PASSIVE ACTIVITIES
PAIN_FUNCTIONAL_ASSESSMENT: 0-10

## 2022-12-28 ASSESSMENT — PAIN DESCRIPTION - ONSET: ONSET: ON-GOING

## 2022-12-28 ASSESSMENT — PAIN DESCRIPTION - LOCATION
LOCATION: NECK;HEAD
LOCATION: NECK;HEAD
LOCATION: HEAD;NECK
LOCATION: HEAD

## 2022-12-28 ASSESSMENT — PAIN SCALES - GENERAL
PAINLEVEL_OUTOF10: 9
PAINLEVEL_OUTOF10: 5
PAINLEVEL_OUTOF10: 9

## 2022-12-28 ASSESSMENT — ENCOUNTER SYMPTOMS: NAUSEA: 1

## 2022-12-28 ASSESSMENT — PAIN DESCRIPTION - FREQUENCY: FREQUENCY: CONTINUOUS

## 2022-12-28 ASSESSMENT — PAIN DESCRIPTION - DESCRIPTORS: DESCRIPTORS: ACHING;THROBBING

## 2022-12-29 VITALS
HEART RATE: 81 BPM | WEIGHT: 200 LBS | DIASTOLIC BLOOD PRESSURE: 64 MMHG | SYSTOLIC BLOOD PRESSURE: 117 MMHG | TEMPERATURE: 98.3 F | BODY MASS INDEX: 34.15 KG/M2 | RESPIRATION RATE: 27 BRPM | OXYGEN SATURATION: 96 % | HEIGHT: 64 IN

## 2022-12-29 LAB — SARS-COV-2, NAAT: NOT DETECTED

## 2022-12-29 PROCEDURE — 6370000000 HC RX 637 (ALT 250 FOR IP): Performed by: STUDENT IN AN ORGANIZED HEALTH CARE EDUCATION/TRAINING PROGRAM

## 2022-12-29 PROCEDURE — 6360000002 HC RX W HCPCS: Performed by: STUDENT IN AN ORGANIZED HEALTH CARE EDUCATION/TRAINING PROGRAM

## 2022-12-29 RX ORDER — LORAZEPAM 2 MG/ML
0.5 INJECTION INTRAMUSCULAR ONCE
Status: COMPLETED | OUTPATIENT
Start: 2022-12-29 | End: 2022-12-29

## 2022-12-29 RX ADMIN — LEVETIRACETAM 500 MG: 500 TABLET, FILM COATED ORAL at 00:12

## 2022-12-29 RX ADMIN — DEXAMETHASONE SODIUM PHOSPHATE 2 MG: 4 INJECTION, SOLUTION INTRAMUSCULAR; INTRAVENOUS at 00:13

## 2022-12-29 RX ADMIN — LORAZEPAM 0.5 MG: 2 INJECTION INTRAMUSCULAR; INTRAVENOUS at 00:32

## 2022-12-29 ASSESSMENT — ENCOUNTER SYMPTOMS
DIARRHEA: 0
SHORTNESS OF BREATH: 0
VOMITING: 0
BACK PAIN: 0
ABDOMINAL PAIN: 0
COUGH: 0
COLOR CHANGE: 0

## 2022-12-29 NOTE — ED NOTES
N2N report called to [id number] [facility] ED. No further questions from accepting facility at this time. PT leaving via air transport.       Naima Winn RN  12/29/22 9430

## 2022-12-29 NOTE — ED PROVIDER NOTES
Patient was discussed with the with the  resident. I have personally interviewed the patient and examined the patient. I have anticipated in the care and decision-making  Please refer the resident's note for final disposition and medical decision making. Subjective:      Patient is a 65-year-old woman complaining of dizziness and headache. She is having problems with vertigo ever since she had COVID last year. She has intermittent episodes especially in the morning when she first gets out of bed. Today she bent over in the garage and developed a severe episode of dizziness and vertigo and fell to the ground. She thinks she might of passed out for few seconds. She denies chest pain shortness of breath or focal neuro complaints. She has a mild headache at this time over the occiput of her head. Objective:           Vitals:    12/28/22 2131   BP: (!) 148/78   Pulse: 75   Resp: 18   Temp:    SpO2: 98%     Alert woman in no acute distress  Pupils nonreactive equal EXTR are intact disc are sharp  Pharynx is benign  Neck is supple adenopathy  Lung sounds are clear and equal  Heart tones are normal regular  Abdomen soft positive bowel sounds nontender  No focal logical deficits    Assessment:     Vertigo  Cephalgia    Plan:                    See ED Record    OMAR Puri MD  12/28/22 2641       Janny Puri MD  12/28/22 3713

## 2022-12-29 NOTE — ED PROVIDER NOTES
HPI   69-year-old female patient present to emergency department for evaluation of sudden onset headache. Patient reporting that she was bending over to pick something up and felt sudden sharp pain in her neck and sudden onset headache maximal intensity. After this headache came on and patient stood up straight daughter was trying to talk to her but patient was not responding, then proceeded to vomit directly afterwards. It started about an hour ago. Patient states that she is currently dizzy describing it as a room spinning sensation. She states that ever since she had COVID she has felt this kind of dizziness usually worse to the left. This dizziness seems unchanged from her usual dizziness. Her PCP gave her some exercises to do at home and that seems to have helped her symptoms. She denies any numbness or weakness anywhere. No slurred speech at present. No chest pain, shortness of breath, abdominal pain. She is feeling nauseated. Review of Systems   Constitutional:  Negative for chills and fever. HENT:  Negative for congestion. Respiratory:  Negative for cough and shortness of breath. Cardiovascular:  Negative for chest pain. Gastrointestinal:  Positive for nausea. Negative for abdominal pain, diarrhea and vomiting. Genitourinary:  Negative for difficulty urinating, dysuria and hematuria. Musculoskeletal:  Negative for back pain. Skin:  Negative for color change. Neurological:  Positive for dizziness and syncope. All other systems reviewed and are negative. Physical Exam  Vitals and nursing note reviewed. Constitutional:       Appearance: Normal appearance. HENT:      Head: Normocephalic and atraumatic. Nose: Nose normal. No congestion. Mouth/Throat:      Mouth: Mucous membranes are moist.      Pharynx: Oropharynx is clear. Eyes:      Conjunctiva/sclera: Conjunctivae normal.      Pupils: Pupils are equal, round, and reactive to light.    Cardiovascular: Rate and Rhythm: Normal rate and regular rhythm. Pulses: Normal pulses. Heart sounds: Normal heart sounds. Pulmonary:      Effort: Pulmonary effort is normal. No respiratory distress. Breath sounds: Normal breath sounds. Abdominal:      General: Bowel sounds are normal. There is no distension. Tenderness: There is no abdominal tenderness. Musculoskeletal:         General: Normal range of motion. Cervical back: Normal range of motion and neck supple. Skin:     General: Skin is warm and dry. Capillary Refill: Capillary refill takes less than 2 seconds. Neurological:      General: No focal deficit present. Mental Status: She is alert. Comments: Finger-nose and heel shin normal bilaterally. Nystagmus to the right. Strength 5 out of 5 symmetric throughout. Sensation and motor grossly intact. Cranial nerves intact. Procedures     MDM  48-year-old female patient present to emergency department for evaluation of headache dizziness. Found to have subarachnoid hemorrhage no shift. She also has 6 mm aneurysm. Spoke with our neurosurgeon here at [facility] and requested patient be transferred to [place]. I contacted  and they accepted her for transfer. Blood pressures have been well controlled here pain controlled as well. No focal deficits on multiple reevaluations. NIH score was 0. ED Course as of 12/29/22 0039   Wed Dec 28, 2022   1904 EKG showing normal sinus rhythm 71 bpm.  No acute ST elevations or depressions. Normal intervals. No prior to compare to. Interpretation performed by ED physician. [FG]   3764 NIH score of 0 [FG]   5540 Spoke with Dr. Rosibel Blake neurosurgery requested patient be sent to [place] for management. [FG]   1741 Patient reassessed by me she is in no acute distress sitting with lights off no focal deficits. [FG]   8337 Spoke with Dr. Devang Gerardo requested Keppra 500 mg and Decadron 2 mg IV transfer ED to ED to [facility] AT [facility]. medications have been reviewed.     Allergies: Edarbyclor [azilsartan-chlorthalidone] and Lasix [furosemide]    -------------------------------------------------- RESULTS -------------------------------------------------    Lab  Results for orders placed or performed during the hospital encounter of 12/28/22   COVID-19, Rapid    Specimen: Nasopharyngeal Swab   Result Value Ref Range    SARS-CoV-2, NAAT Not Detected Not Detected   Comprehensive Metabolic Panel w/ Reflex to MG   Result Value Ref Range    Sodium 139 132 - 146 mmol/L    Potassium reflex Magnesium 4.3 3.5 - 5.0 mmol/L    Chloride 96 (L) 98 - 107 mmol/L    CO2 33 (H) 22 - 29 mmol/L    Anion Gap 10 7 - 16 mmol/L    Glucose 111 (H) 74 - 99 mg/dL    BUN 18 6 - 20 mg/dL    Creatinine 0.7 0.5 - 1.0 mg/dL    Est, Glom Filt Rate >60 >=60 mL/min/1.73    Calcium 10.2 8.6 - 10.2 mg/dL    Total Protein 8.3 6.4 - 8.3 g/dL    Albumin 4.4 3.5 - 5.2 g/dL    Total Bilirubin 0.3 0.0 - 1.2 mg/dL    Alkaline Phosphatase 76 35 - 104 U/L    ALT 28 0 - 32 U/L    AST 38 (H) 0 - 31 U/L   SPECIMEN REJECTION   Result Value Ref Range    Rejected Test trp     Reason for Rejection see below    CBC with Auto Differential   Result Value Ref Range    WBC 13.2 (H) 4.5 - 11.5 E9/L    RBC 4.61 3.50 - 5.50 E12/L    Hemoglobin 11.8 11.5 - 15.5 g/dL    Hematocrit 37.6 34.0 - 48.0 %    MCV 81.6 80.0 - 99.9 fL    MCH 25.6 (L) 26.0 - 35.0 pg    MCHC 31.4 (L) 32.0 - 34.5 %    RDW 13.7 11.5 - 15.0 fL    Platelets 496 563 - 340 E9/L    MPV 9.4 7.0 - 12.0 fL    Neutrophils % 83.9 (H) 43.0 - 80.0 %    Immature Granulocytes % 0.5 0.0 - 5.0 %    Lymphocytes % 11.1 (L) 20.0 - 42.0 %    Monocytes % 4.0 2.0 - 12.0 %    Eosinophils % 0.2 0.0 - 6.0 %    Basophils % 0.3 0.0 - 2.0 %    Neutrophils Absolute 11.07 (H) 1.80 - 7.30 E9/L    Immature Granulocytes # 0.06 E9/L    Lymphocytes Absolute 1.46 (L) 1.50 - 4.00 E9/L    Monocytes Absolute 0.53 0.10 - 0.95 E9/L    Eosinophils Absolute 0.03 (L) 0.05 - 0.50 E9/L Basophils Absolute 0.04 0.00 - 0.20 E9/L   Troponin   Result Value Ref Range    Troponin, High Sensitivity 8 0 - 9 ng/L       Radiology  CTA HEAD W CONTRAST   Final Result   1. Acute subarachnoid hemorrhage predominantly in the frontal lobes along the   interhemispheric fissure. No significant mass effect or midline shift. 2. There is a 6 mm aneurysm arising from the left anterior cerebral artery. Follow-up catheter angiogram is recommended for complete assessment. 3. Motion limited CTA neck demonstrates no definite acute abnormality. Further evaluation on the catheter angiogram can be done for complete   assessment. Critical results were called by Dr. Bertin Noyola to Dr. Audrey Gamino On   12/28/2022 at 21:44. CTA NECK W CONTRAST   Final Result   1. Acute subarachnoid hemorrhage predominantly in the frontal lobes along the   interhemispheric fissure. No significant mass effect or midline shift. 2. There is a 6 mm aneurysm arising from the left anterior cerebral artery. Follow-up catheter angiogram is recommended for complete assessment. 3. Motion limited CTA neck demonstrates no definite acute abnormality. Further evaluation on the catheter angiogram can be done for complete   assessment. Critical results were called by Dr. Bertin Noyola to Dr. Audrey Gamino On   12/28/2022 at 21:44. XR CHEST 1 VIEW   Final Result   No acute process. ------------------------- NURSING NOTES AND VITALS REVIEWED ---------------------------  Date / Time Roomed:  12/28/2022  6:29 PM  ED Bed Assignment:  07/07    The nursing notes within the ED encounter and vital signs as below have been reviewed.    Patient Vitals for the past 24 hrs:   BP Temp Temp src Pulse Resp SpO2 Height Weight   12/29/22 0015 117/64 -- -- 81 27 96 % -- --   12/29/22 0000 121/63 -- -- 82 28 94 % -- --   12/28/22 2230 (!) 152/87 -- -- 83 18 96 % -- --   12/28/22 2131 (!) 148/78 -- -- 75 18 98 % -- --   12/28/22 1902 (!) 145/90 98.3 °F (36.8 °C) Oral 77 18 99 % 5' 4\" (1.626 m) 200 lb (90.7 kg)       Oxygen Saturation Interpretation: Normal    --------------------------------- ADDITIONAL PROVIDER NOTES ---------------------------------      This patient's ED course included: a personal history and physicial examination, re-evaluation prior to disposition, multiple bedside re-evaluations, IV medications, cardiac monitoring, continuous pulse oximetry, and complex medical decision making and emergency management    This patient has remained hemodynamically stable during their ED course. Please note that the withdrawal or failure to initiate urgent interventions for this patient would likely result in a life threatening deterioration or permanent disability. Accordingly this patient received 30 minutes of critical care time, excluding separately billable procedures. Clinical Impression  1. Subarachnoid hemorrhage ([facility] [address]    2. Brain aneurysm          Disposition  Patient's disposition: Transfer to [address] [place] [address]  Patient's condition is serious.          Chacorta Henriquez DO  Resident  12/29/22 8266

## 2022-12-30 LAB
EKG ATRIAL RATE: 71 BPM
EKG P AXIS: 62 DEGREES
EKG P-R INTERVAL: 198 MS
EKG Q-T INTERVAL: 398 MS
EKG QRS DURATION: 70 MS
EKG QTC CALCULATION (BAZETT): 432 MS
EKG R AXIS: -5 DEGREES
EKG T AXIS: 20 DEGREES
EKG VENTRICULAR RATE: 71 BPM

## 2022-12-30 PROCEDURE — 93010 ELECTROCARDIOGRAM REPORT: CPT | Performed by: INTERNAL MEDICINE

## 2023-10-06 DIAGNOSIS — I72.9 ANEURYSM (CMS-HCC): Primary | ICD-10-CM

## 2023-10-07 ENCOUNTER — LAB (OUTPATIENT)
Dept: LAB | Facility: LAB | Age: 58
End: 2023-10-07
Payer: COMMERCIAL

## 2023-10-07 DIAGNOSIS — I72.9 ANEURYSM (CMS-HCC): ICD-10-CM

## 2023-10-07 LAB
ANION GAP SERPL CALC-SCNC: 12 MMOL/L (ref 10–20)
BUN SERPL-MCNC: 20 MG/DL (ref 6–23)
CALCIUM SERPL-MCNC: 9.4 MG/DL (ref 8.6–10.3)
CHLORIDE SERPL-SCNC: 101 MMOL/L (ref 98–107)
CO2 SERPL-SCNC: 30 MMOL/L (ref 21–32)
CREAT SERPL-MCNC: 0.68 MG/DL (ref 0.5–1.05)
ERYTHROCYTE [DISTWIDTH] IN BLOOD BY AUTOMATED COUNT: 14.4 % (ref 11.5–14.5)
GFR SERPL CREATININE-BSD FRML MDRD: >90 ML/MIN/1.73M*2
GLUCOSE SERPL-MCNC: 105 MG/DL (ref 74–99)
HCT VFR BLD AUTO: 39.1 % (ref 36–46)
HGB BLD-MCNC: 12.3 G/DL (ref 12–16)
INR PPP: 1.1 (ref 0.9–1.1)
MCH RBC QN AUTO: 25.1 PG (ref 26–34)
MCHC RBC AUTO-ENTMCNC: 31.5 G/DL (ref 32–36)
MCV RBC AUTO: 80 FL (ref 80–100)
NRBC BLD-RTO: 0 /100 WBCS (ref 0–0)
PLATELET # BLD AUTO: 306 X10*3/UL (ref 150–450)
PMV BLD AUTO: 9.7 FL (ref 7.5–11.5)
POTASSIUM SERPL-SCNC: 3.3 MMOL/L (ref 3.5–5.3)
PROTHROMBIN TIME: 12.3 SECONDS (ref 9.8–12.8)
RBC # BLD AUTO: 4.9 X10*6/UL (ref 4–5.2)
SODIUM SERPL-SCNC: 140 MMOL/L (ref 136–145)
WBC # BLD AUTO: 9.1 X10*3/UL (ref 4.4–11.3)

## 2023-10-07 PROCEDURE — 85027 COMPLETE CBC AUTOMATED: CPT

## 2023-10-07 PROCEDURE — 85610 PROTHROMBIN TIME: CPT

## 2023-10-07 PROCEDURE — 36415 COLL VENOUS BLD VENIPUNCTURE: CPT

## 2023-10-07 PROCEDURE — 80048 BASIC METABOLIC PNL TOTAL CA: CPT

## 2023-10-09 ENCOUNTER — HOSPITAL ENCOUNTER (OUTPATIENT)
Dept: RADIOLOGY | Facility: HOSPITAL | Age: 58
Discharge: HOME | End: 2023-10-09
Payer: COMMERCIAL

## 2023-10-09 VITALS
HEART RATE: 75 BPM | OXYGEN SATURATION: 99 % | DIASTOLIC BLOOD PRESSURE: 73 MMHG | TEMPERATURE: 98.4 F | SYSTOLIC BLOOD PRESSURE: 124 MMHG | RESPIRATION RATE: 18 BRPM

## 2023-10-09 DIAGNOSIS — I72.9 ANEURYSM OF UNSPECIFIED SITE (CMS-HCC): ICD-10-CM

## 2023-10-09 PROCEDURE — C1760 CLOSURE DEV, VASC: HCPCS | Performed by: STUDENT IN AN ORGANIZED HEALTH CARE EDUCATION/TRAINING PROGRAM

## 2023-10-09 PROCEDURE — 2780000003 HC OR 278 NO HCPCS: Performed by: STUDENT IN AN ORGANIZED HEALTH CARE EDUCATION/TRAINING PROGRAM

## 2023-10-09 PROCEDURE — 36224 PLACE CATH CAROTD ART: CPT | Performed by: NEUROLOGICAL SURGERY

## 2023-10-09 PROCEDURE — 36226 PLACE CATH VERTEBRAL ART: CPT | Performed by: NEUROLOGICAL SURGERY

## 2023-10-09 PROCEDURE — 76376 3D RENDER W/INTRP POSTPROCES: CPT | Performed by: NEUROLOGICAL SURGERY

## 2023-10-09 PROCEDURE — 75710 ARTERY X-RAYS ARM/LEG: CPT | Mod: RT | Performed by: NEUROLOGICAL SURGERY

## 2023-10-09 PROCEDURE — 2720000007 HC OR 272 NO HCPCS: Performed by: STUDENT IN AN ORGANIZED HEALTH CARE EDUCATION/TRAINING PROGRAM

## 2023-10-09 PROCEDURE — 2500000004 HC RX 250 GENERAL PHARMACY W/ HCPCS (ALT 636 FOR OP/ED): Performed by: STUDENT IN AN ORGANIZED HEALTH CARE EDUCATION/TRAINING PROGRAM

## 2023-10-09 PROCEDURE — 76377 3D RENDER W/INTRP POSTPROCES: CPT | Performed by: NEUROLOGICAL SURGERY

## 2023-10-09 PROCEDURE — 99152 MOD SED SAME PHYS/QHP 5/>YRS: CPT | Performed by: STUDENT IN AN ORGANIZED HEALTH CARE EDUCATION/TRAINING PROGRAM

## 2023-10-09 PROCEDURE — 99153 MOD SED SAME PHYS/QHP EA: CPT | Performed by: STUDENT IN AN ORGANIZED HEALTH CARE EDUCATION/TRAINING PROGRAM

## 2023-10-09 PROCEDURE — 36224 PLACE CATH CAROTD ART: CPT | Mod: 50 | Performed by: NEUROLOGICAL SURGERY

## 2023-10-09 PROCEDURE — 99153 MOD SED SAME PHYS/QHP EA: CPT | Performed by: NEUROLOGICAL SURGERY

## 2023-10-09 PROCEDURE — 99152 MOD SED SAME PHYS/QHP 5/>YRS: CPT | Performed by: NEUROLOGICAL SURGERY

## 2023-10-09 PROCEDURE — C1769 GUIDE WIRE: HCPCS | Performed by: STUDENT IN AN ORGANIZED HEALTH CARE EDUCATION/TRAINING PROGRAM

## 2023-10-09 RX ORDER — MIDAZOLAM HYDROCHLORIDE 1 MG/ML
INJECTION INTRAMUSCULAR; INTRAVENOUS AS NEEDED
Status: COMPLETED | OUTPATIENT
Start: 2023-10-09 | End: 2023-10-09

## 2023-10-09 RX ORDER — FENTANYL CITRATE 50 UG/ML
INJECTION, SOLUTION INTRAMUSCULAR; INTRAVENOUS AS NEEDED
Status: COMPLETED | OUTPATIENT
Start: 2023-10-09 | End: 2023-10-09

## 2023-10-09 RX ADMIN — FENTANYL CITRATE 50 MCG: 50 INJECTION, SOLUTION INTRAMUSCULAR; INTRAVENOUS at 08:45

## 2023-10-09 RX ADMIN — MIDAZOLAM HYDROCHLORIDE 1 MG: 1 INJECTION, SOLUTION INTRAMUSCULAR; INTRAVENOUS at 08:53

## 2023-10-09 RX ADMIN — MIDAZOLAM HYDROCHLORIDE 1 MG: 1 INJECTION, SOLUTION INTRAMUSCULAR; INTRAVENOUS at 08:45

## 2023-10-09 ASSESSMENT — PAIN SCALES - GENERAL

## 2023-10-09 ASSESSMENT — PAIN - FUNCTIONAL ASSESSMENT

## 2023-10-09 NOTE — H&P
Pre-Procedure H&P      Provider Assessment:  Diagnosis/Reason for Procedure: follow-up after aneurysm coiling  Procedure: Diagnostic Cerebral Angiogram  Medications Reviewed:   yes   Prophylatic Antibiotics Needed:   no     Neuro status: alert, Ox4, full strength throughout  Mouth Opening OK:    yes   Neck Flexibility OK:      yes   Sedation Plan:  moderate sedation   COVID-19 Risk Consent:  Surgeon has reviewed key risks related to the risk of chiki COVID-19 and if they contract COVID-19 what the risks are.

## 2023-10-09 NOTE — PROCEDURES
Pre-Procedure Verification and Time Out:  Procedure Location: procedure area  HUDDLE - Pre-procedure Verification:  completed  TIME OUT - Final Verification:  completed immediately prior to procedure start  DEBRIEF: completed    Complications:  None; patient tolerated the procedure well.     Disposition: [facility]  Condition: stable  Specimens Collected: No specimens collected    General Information:   Anesthesia/ sedation: Non-Anesthesia  Indication(s)/Pre - Procedure Diagnoses: cerebral aneurysm   Post-Procedure Diagnosis: same\  Procedure Name: Diagnostic Cerebral Angiogram  Procedure performed by: Govind Luna  Assistant(s): Daniel  Estimated Blood Loss (mL): 10  Specimen: no  Informed Consent: consent obtained and in chart     Access: 5 Fr Sheath in R CFA  Closure: Mynx  Vessels Injected: R ICA , L vert, L ICA including 3D, R CFA   Moderate Sedation Time: 45 minutes  Findings: Small base remnant for left A2/3 junction aneurysm. Full report to follow in PACS dictation

## 2023-10-09 NOTE — Clinical Note
Diagnostic cerebral angiogram completed, pt tolerated well with 2mg versed and 50mcg fentanyl IVP, VSS. Right femoral artery was closed with a mynx device. Orders for bedrest and to keep right leg straight for 3 hours per MD. Pt will go to [facility].

## 2023-10-19 ENCOUNTER — PREP FOR PROCEDURE (OUTPATIENT)
Dept: RADIOLOGY | Facility: HOSPITAL | Age: 58
End: 2023-10-19
Payer: COMMERCIAL

## 2023-10-19 DIAGNOSIS — I67.1: Primary | ICD-10-CM

## 2023-10-20 PROBLEM — I67.1: Status: ACTIVE | Noted: 2023-10-19

## 2023-11-06 PROBLEM — K64.8 INTERNAL AND EXTERNAL HEMORRHOIDS WITHOUT COMPLICATION: Status: ACTIVE | Noted: 2023-11-06

## 2023-11-06 PROBLEM — K64.4 INTERNAL AND EXTERNAL HEMORRHOIDS WITHOUT COMPLICATION: Status: ACTIVE | Noted: 2023-11-06

## 2023-11-06 PROBLEM — K76.89 LIVER CYST: Status: ACTIVE | Noted: 2022-03-22

## 2023-11-06 PROBLEM — Q43.8 TORTUOUS COLON: Status: ACTIVE | Noted: 2023-11-06

## 2023-11-06 PROBLEM — I10 HTN (HYPERTENSION): Status: ACTIVE | Noted: 2022-03-22

## 2023-11-06 RX ORDER — NITROFURANTOIN 25; 75 MG/1; MG/1
100 CAPSULE ORAL EVERY 12 HOURS
COMMUNITY
Start: 2023-06-16 | End: 2023-06-21

## 2023-11-06 RX ORDER — CHLORTHALIDONE 25 MG/1
25 TABLET ORAL DAILY
COMMUNITY

## 2023-11-06 RX ORDER — NIFEDIPINE 30 MG/1
1 TABLET, FILM COATED, EXTENDED RELEASE ORAL DAILY
COMMUNITY

## 2023-11-06 RX ORDER — DIPHENHYD/PHENYLEPH/ACETAMINOP 25-650/30
650 LIQUID (ML) ORAL 4 TIMES DAILY
COMMUNITY
Start: 2023-01-07 | End: 2023-11-15 | Stop reason: ALTCHOICE

## 2023-11-06 RX ORDER — CYCLOBENZAPRINE HCL 10 MG
10 TABLET ORAL 3 TIMES DAILY
COMMUNITY
Start: 2023-01-07 | End: 2023-11-15 | Stop reason: ALTCHOICE

## 2023-11-06 RX ORDER — POTASSIUM CHLORIDE 20 MEQ/1
1 TABLET, EXTENDED RELEASE ORAL DAILY
COMMUNITY

## 2023-11-06 RX ORDER — BUSPIRONE HYDROCHLORIDE 5 MG/1
5 TABLET ORAL 2 TIMES DAILY
COMMUNITY
Start: 2023-01-13 | End: 2023-11-15 | Stop reason: ALTCHOICE

## 2023-11-06 RX ORDER — ESCITALOPRAM OXALATE 5 MG/1
5 TABLET ORAL DAILY
COMMUNITY
Start: 2023-04-06 | End: 2023-11-15 | Stop reason: ALTCHOICE

## 2023-11-06 RX ORDER — FLUTICASONE PROPIONATE 50 MCG
1 SPRAY, SUSPENSION (ML) NASAL DAILY
COMMUNITY
Start: 2023-09-05

## 2023-11-06 RX ORDER — ALBUTEROL SULFATE 90 UG/1
2 AEROSOL, METERED RESPIRATORY (INHALATION) EVERY 4 HOURS PRN
COMMUNITY

## 2023-11-06 NOTE — PROGRESS NOTES
Ms. MAGANA is a very nice 57 year old woman with aneurysmal subarachnoid hemorrhage on 12/29/2022 status post left pericallosal GENA aneurysm coiling. She did very well overall and was discharged home on 1/7/2023.  She was found to have increased size of the base remnant on MRA and confirmed on angiography.  I again went over the options for treatment for this including endovascular and microsurgical clipping.  Because of the morphology and location and previous rupture, I recommended treatment via microsurgical clipping.  I went over the risks of the surgery including stroke, hemorrhage, infection, seizures.  Her case was discussed at multidisciplinary neurovascular conference.    Prep Time On Date of the Patient Encounter:    10 Minutes  Time Directly with Patient/Family/Caregiver:    15 Minutes  Additional Time Spent on Patient Care Activities:   0 Minutes  Documentation Time:       5 Minutes  Other Time Spent:       0 Minutes    Details of Other Time Spent:      Total Time on Date of Patient Encounter:    30 Minutes

## 2023-11-07 ENCOUNTER — OFFICE VISIT (OUTPATIENT)
Dept: NEUROSURGERY | Facility: CLINIC | Age: 58
End: 2023-11-07
Payer: COMMERCIAL

## 2023-11-07 VITALS
BODY MASS INDEX: 34.15 KG/M2 | WEIGHT: 200 LBS | TEMPERATURE: 98.3 F | DIASTOLIC BLOOD PRESSURE: 75 MMHG | SYSTOLIC BLOOD PRESSURE: 139 MMHG | HEIGHT: 64 IN | HEART RATE: 86 BPM

## 2023-11-07 DIAGNOSIS — Z86.79 HISTORY OF NONTRAUMATIC RUPTURE OF CEREBRAL ANEURYSM: Primary | ICD-10-CM

## 2023-11-07 DIAGNOSIS — I67.1: ICD-10-CM

## 2023-11-07 PROCEDURE — 99214 OFFICE O/P EST MOD 30 MIN: CPT | Performed by: NEUROLOGICAL SURGERY

## 2023-11-07 PROCEDURE — 3075F SYST BP GE 130 - 139MM HG: CPT | Performed by: NEUROLOGICAL SURGERY

## 2023-11-07 PROCEDURE — 3078F DIAST BP <80 MM HG: CPT | Performed by: NEUROLOGICAL SURGERY

## 2023-11-07 PROCEDURE — 1036F TOBACCO NON-USER: CPT | Performed by: NEUROLOGICAL SURGERY

## 2023-11-07 PROCEDURE — 3008F BODY MASS INDEX DOCD: CPT | Performed by: NEUROLOGICAL SURGERY

## 2023-11-07 RX ORDER — LORAZEPAM 0.5 MG/1
0.5 TABLET ORAL EVERY 8 HOURS PRN
COMMUNITY

## 2023-11-07 ASSESSMENT — PAIN SCALES - GENERAL: PAINLEVEL: 0-NO PAIN

## 2023-11-09 ENCOUNTER — APPOINTMENT (OUTPATIENT)
Dept: PREADMISSION TESTING | Facility: HOSPITAL | Age: 58
End: 2023-11-09
Payer: COMMERCIAL

## 2023-11-15 ENCOUNTER — PRE-ADMISSION TESTING (OUTPATIENT)
Dept: PREADMISSION TESTING | Facility: HOSPITAL | Age: 58
End: 2023-11-15
Payer: COMMERCIAL

## 2023-11-15 VITALS
TEMPERATURE: 97.7 F | HEART RATE: 94 BPM | DIASTOLIC BLOOD PRESSURE: 78 MMHG | OXYGEN SATURATION: 98 % | HEIGHT: 64 IN | WEIGHT: 221.78 LBS | BODY MASS INDEX: 37.86 KG/M2 | SYSTOLIC BLOOD PRESSURE: 122 MMHG

## 2023-11-15 DIAGNOSIS — K76.89 LIVER CYST: ICD-10-CM

## 2023-11-15 DIAGNOSIS — Z01.818 PREOPERATIVE TESTING: Primary | ICD-10-CM

## 2023-11-15 LAB
ABO GROUP (TYPE) IN BLOOD: NORMAL
ANION GAP SERPL CALC-SCNC: 16 MMOL/L (ref 10–20)
ANTIBODY SCREEN: NORMAL
APPEARANCE UR: ABNORMAL
APTT PPP: 36 SECONDS (ref 27–38)
BILIRUB UR STRIP.AUTO-MCNC: NEGATIVE MG/DL
BUN SERPL-MCNC: 20 MG/DL (ref 6–23)
CALCIUM SERPL-MCNC: 9.5 MG/DL (ref 8.6–10.6)
CHLORIDE SERPL-SCNC: 100 MMOL/L (ref 98–107)
CO2 SERPL-SCNC: 29 MMOL/L (ref 21–32)
COLOR UR: ABNORMAL
CREAT SERPL-MCNC: 0.92 MG/DL (ref 0.5–1.05)
ERYTHROCYTE [DISTWIDTH] IN BLOOD BY AUTOMATED COUNT: 14.1 % (ref 11.5–14.5)
GFR SERPL CREATININE-BSD FRML MDRD: 72 ML/MIN/1.73M*2
GLUCOSE SERPL-MCNC: 107 MG/DL (ref 74–99)
GLUCOSE UR STRIP.AUTO-MCNC: NEGATIVE MG/DL
HCT VFR BLD AUTO: 38.5 % (ref 36–46)
HGB BLD-MCNC: 11.8 G/DL (ref 12–16)
HOLD SPECIMEN: NORMAL
INR PPP: 1.1 (ref 0.9–1.1)
KETONES UR STRIP.AUTO-MCNC: NEGATIVE MG/DL
LEUKOCYTE ESTERASE UR QL STRIP.AUTO: NEGATIVE
MCH RBC QN AUTO: 25 PG (ref 26–34)
MCHC RBC AUTO-ENTMCNC: 30.6 G/DL (ref 32–36)
MCV RBC AUTO: 82 FL (ref 80–100)
NITRITE UR QL STRIP.AUTO: NEGATIVE
NRBC BLD-RTO: 0 /100 WBCS (ref 0–0)
PH UR STRIP.AUTO: 5 [PH]
PLATELET # BLD AUTO: 348 X10*3/UL (ref 150–450)
POTASSIUM SERPL-SCNC: 3.6 MMOL/L (ref 3.5–5.3)
PROT UR STRIP.AUTO-MCNC: NEGATIVE MG/DL
PROTHROMBIN TIME: 12 SECONDS (ref 9.8–12.8)
RBC # BLD AUTO: 4.72 X10*6/UL (ref 4–5.2)
RBC # UR STRIP.AUTO: NEGATIVE /UL
RH FACTOR (ANTIGEN D): NORMAL
SODIUM SERPL-SCNC: 141 MMOL/L (ref 136–145)
SP GR UR STRIP.AUTO: 1.02
UROBILINOGEN UR STRIP.AUTO-MCNC: <2 MG/DL
WBC # BLD AUTO: 11.7 X10*3/UL (ref 4.4–11.3)

## 2023-11-15 PROCEDURE — 83036 HEMOGLOBIN GLYCOSYLATED A1C: CPT

## 2023-11-15 PROCEDURE — 86920 COMPATIBILITY TEST SPIN: CPT

## 2023-11-15 PROCEDURE — 86900 BLOOD TYPING SEROLOGIC ABO: CPT

## 2023-11-15 PROCEDURE — 80048 BASIC METABOLIC PNL TOTAL CA: CPT

## 2023-11-15 PROCEDURE — 81003 URINALYSIS AUTO W/O SCOPE: CPT

## 2023-11-15 PROCEDURE — 85610 PROTHROMBIN TIME: CPT

## 2023-11-15 PROCEDURE — 99214 OFFICE O/P EST MOD 30 MIN: CPT | Performed by: NURSE PRACTITIONER

## 2023-11-15 PROCEDURE — 85027 COMPLETE CBC AUTOMATED: CPT

## 2023-11-15 PROCEDURE — 87081 CULTURE SCREEN ONLY: CPT

## 2023-11-15 PROCEDURE — 36415 COLL VENOUS BLD VENIPUNCTURE: CPT

## 2023-11-15 PROCEDURE — 85730 THROMBOPLASTIN TIME PARTIAL: CPT

## 2023-11-15 PROCEDURE — 80061 LIPID PANEL: CPT

## 2023-11-15 RX ORDER — CHLORHEXIDINE GLUCONATE 40 MG/ML
SOLUTION TOPICAL
Qty: 473 ML | Refills: 0 | Status: SHIPPED | OUTPATIENT
Start: 2023-11-15 | End: 2023-12-09 | Stop reason: HOSPADM

## 2023-11-15 RX ORDER — CHLORHEXIDINE GLUCONATE ORAL RINSE 1.2 MG/ML
SOLUTION DENTAL
Qty: 120 ML | Refills: 0 | Status: SHIPPED | OUTPATIENT
Start: 2023-11-15 | End: 2023-12-09 | Stop reason: HOSPADM

## 2023-11-15 RX ORDER — MULTIVITAMIN/IRON/FOLIC ACID 18MG-0.4MG
1 TABLET ORAL DAILY
COMMUNITY

## 2023-11-15 RX ORDER — VIT C/E/ZN/COPPR/LUTEIN/ZEAXAN 250MG-90MG
25 CAPSULE ORAL DAILY
COMMUNITY

## 2023-11-15 ASSESSMENT — ENCOUNTER SYMPTOMS
CONSTITUTIONAL NEGATIVE: 1
NECK NEGATIVE: 1
EYES NEGATIVE: 1
NEUROLOGICAL NEGATIVE: 1
RESPIRATORY NEGATIVE: 1
MUSCULOSKELETAL NEGATIVE: 1
CARDIOVASCULAR NEGATIVE: 1
ENDOCRINE NEGATIVE: 1
GASTROINTESTINAL NEGATIVE: 1

## 2023-11-15 ASSESSMENT — CHADS2 SCORE
CHF: NO
HYPERTENSION: YES
PRIOR STROKE OR TIA OR THROMBOEMBOLISM: NO
DIABETES: NO
CHADS2 SCORE: 1
AGE GREATER THAN OR EQUAL TO 75: NO

## 2023-11-15 ASSESSMENT — LIFESTYLE VARIABLES: SMOKING_STATUS: NONSMOKER

## 2023-11-15 NOTE — PREPROCEDURE INSTRUCTIONS
NPO Instructions:    Do not eat any food after midnight the night before your surgery/procedure.  You may have up to TEN ounces of clear liquids until TWO hours before your instructed arrival time to the hospital. This includes water, black tea/coffee, (no milk or cream), apple juice, and/or electrolyte drinks (Gatorade).  Yo may chew gum up to TWO hours before your surgery/procedure.    Additional Instructions:     We have sent a prescription for Hibiclens soap and Peridex mouth wash to your preferred pharmacy.  If you have not already, Please  your prescription and start using five days before surgery.  Follow the instruction sheet provided to you at your CPM/PAT appointment.    Avoid herbal supplements, multivitamins and NSAIDS (non-steroidal anti-inflammatory drugs) such as Advil, Aleve, Ibuprofen, Naproxen, Excedrin, Meloxicam or Celebrex for at least 7 days prior to surgery. May take Tylenol as needed.    Seven/Six Days before Surgery:  Review your medication instructions, stop indicated medications    Day of Surgery:  Review your medication instructions, take indicated medications  Wear comfortable loose fitting clothing  Do not use moisturizers, creams, lotions or perfume  All jewelry and valuables should be left at home    [facility]  [facility]  Elxkz-[phone]  [phone]  [email or web address]                                                  PER LAB CAN ADD LIPASE

## 2023-11-15 NOTE — CPM/PAT H&P
CPM/PAT Evaluation       Name: Mary Zapata)  /Age: 1965/58 y.o.     Visit Type:   In-Person       Chief Complaint: preoperative evaluation, aneurysm    HPI    The patient is a 57 year old female with history of aneurysmal subarachnoid hemorrhage on 2022 status post left pericallosal GENA aneurysm coiling. She was recently found to have increased size of the base remnant on MRA and confirmed on angiography.  She presents today for perioperative evaluation in anticipation of left craniotomy for clipping of left GENA aneurysm on 23 with Dr. Luna.    Past Medical History:   Diagnosis Date    Anxiety     Cerebral aneurysm     Hypertension        Past Surgical History:   Procedure Laterality Date    APPENDECTOMY       SECTION, CLASSIC      CT ANGIO NECK  2022    CT NECK ANGIO W AND WO IV CONTRAST 2022    CT HEAD ANGIO W AND WO IV CONTRAST  2022    CT HEAD ANGIO W AND WO IV CONTRAST 2022    IR ANGIOGRAM CEREBRAL BILATERAL Bilateral 10/09/2023    IR ANGIOGRAM CEREBRAL BILATERAL 10/9/2023 [facility] ANGIO    MR HEAD ANGIO W AND WO IV CONTRAST  2023    MR HEAD ANGIO W AND WO IV CONTRAST 2023 [facility]    MR HEAD ANGIO W AND WO IV CONTRAST  2023    MR HEAD ANGIO W AND WO IV CONTRAST 2023 [facility] MRI       Patient Sexual activity questions deferred to the physician.    Family History   Problem Relation Name Age of Onset    Hyperlipidemia Mother      Hypertension Mother         Allergies   Allergen Reactions    Azilsartan Shortness of breath, Other and Rash    Azilsartan Med-Chlorthalidone Other     Cant breathe    Furosemide Anaphylaxis, Shortness of breath, Other and Rash       Prior to Admission medications    Medication Sig Start Date End Date Taking? Authorizing Provider   albuterol 90 mcg/actuation inhaler Inhale 2 puffs every 4 hours if needed.    Historical Provider, MD block complex 0.4 mg tablet Take 1 tablet by mouth once daily.     Historical Provider, MD   chlorhexidine (Hibiclens) 4 % external liquid Use as directed preoperatively 11/15/23   CHRIS Johansen   chlorhexidine (Peridex) 0.12 % solution Swish and spit. Do not swallow. Use as directed preoperatively 11/15/23   CHRIS Johansen   chlorthalidone (Hygroton) 25 mg tablet Take 1 tablet (25 mg) by mouth once daily.    Historical Provider, MD   cholecalciferol (Vitamin D-3) 25 MCG (1000 UT) capsule Take 1 capsule (25 mcg) by mouth once daily.    Historical Provider, MD   fluticasone (Flonase) 50 mcg/actuation nasal spray Administer 1 spray into affected nostril(s) once daily. 9/5/23   Historical Provider, MD   LORazepam (Ativan) 0.5 mg tablet Take 1 tablet (0.5 mg) by mouth every 8 hours if needed for anxiety.    Historical Provider, MD   NIFEdipine ER (NIFEdipine CC) 30 mg 24 hr tablet Take 1 tablet (30 mg) by mouth once daily.    Historical Provider, MD   potassium chloride CR 20 mEq ER tablet Take 1 tablet (20 mEq) by mouth once daily.    Historical Provider, MD   Arthritis Pain Reliever 650 mg ER tablet Take 1 tablet (650 mg) by mouth 4 times a day. 1/7/23 11/15/23  Historical Provider, MD   busPIRone (Buspar) 5 mg tablet Take 1 tablet (5 mg) by mouth 2 times a day. 1/13/23 11/15/23  Historical Provider, MD   cyclobenzaprine (Flexeril) 10 mg tablet Take 1 tablet (10 mg) by mouth 3 times a day. 1/7/23 11/15/23  Historical Provider, MD   escitalopram (Lexapro) 5 mg tablet Take 1 tablet (5 mg) by mouth once daily. 4/6/23 11/15/23  Historical Provider, MD RUELAS ROS:   Constitutional:   neg    Neuro/Psych:   neg    Eyes:   neg    Ears:   neg    Nose:   neg    Mouth:   neg    Throat:   neg    Neck:   neg    Cardio:   neg    Respiratory:   neg    Endocrine:   neg    GI:   neg    :   neg    Musculoskeletal:   neg    Hematologic:   neg    Skin:  neg        Physical Exam  Vitals reviewed.   Constitutional:       Appearance: Normal appearance.   HENT:      Head:  Normocephalic and atraumatic.      Nose: Nose normal.      Mouth/Throat:      Mouth: Mucous membranes are moist.      Pharynx: Oropharynx is clear.   Eyes:      Extraocular Movements: Extraocular movements intact.      Conjunctiva/sclera: Conjunctivae normal.      Pupils: Pupils are equal, round, and reactive to light.   Cardiovascular:      Rate and Rhythm: Normal rate and regular rhythm.      Pulses: Normal pulses.      Heart sounds: Normal heart sounds.   Pulmonary:      Effort: Pulmonary effort is normal.      Breath sounds: Normal breath sounds.   Musculoskeletal:         General: Normal range of motion.      Cervical back: Normal range of motion.   Skin:     General: Skin is warm and dry.   Neurological:      General: No focal deficit present.      Mental Status: She is alert and oriented to person, place, and time.   Psychiatric:         Mood and Affect: Mood normal.         Behavior: Behavior normal.          PAT AIRWAY:   Airway:     Mallampati::  III    TM distance::  >3 FB    Neck ROM::  Full  normal        Visit Vitals  /78   Pulse 94   Temp 36.5 °C (97.7 °F) (Oral)       DASI Risk Score    No data to display       Caprini DVT Assessment      Flowsheet Row Most Recent Value   DVT Score 10   Current Status Major surgery planned, lasting over 3 hours   History Prior major surgery   Age 40-59 years   BMI 31-40 (Obesity)          Modified Frailty Index      Flowsheet Row Most Recent Value   Modified Frailty Index Calculator .0909          CHADS2 Stroke Risk  Current as of today        N/A 3 - 100%: High Risk   2 - 3%: Medium Risk   0 - 2%: Low Risk     Last Change: N/A          This score determines the patient's risk of having a stroke if the patient has atrial fibrillation.        This score is not applicable to this patient. Components are not calculated.          Revised Cardiac Risk Index      Flowsheet Row Most Recent Value   Revised Cardiac Risk Calculator 0          Apfel Simplified Score       Flowsheet Row Most Recent Value   Apfel Simplified Score Calculator 3          Risk Analysis Index Results This Encounter    No data found in the last 1 encounters.       Stop Bang Score      Flowsheet Row Most Recent Value   Do you snore loudly? 0   Do you often feel tired or fatigued after your sleep? 0   Has anyone ever observed you stop breathing in your sleep? 0   Do you have or are you being treated for high blood pressure? 1   Recent BMI (Calculated) 34.3   Is BMI greater than 35 kg/m2? 0=No   Age older than 50 years old? 1=Yes   Is your neck circumference greater than 17 inches (Male) or 16 inches (Female)? 1   Gender - Male 0=No   STOP-BANG Total Score 3          No results found for this or any previous visit (from the past 96 hour(s)).       Diagnostic Results      Vascular Lab Report  Transcranial Doppler Complete    Study Date:       1/6/2023   CONCLUSIONS:  Transcranial Doppler: Normal TCD study.      TRANSTHORACIC ECHOCARDIOGRAM REPORT   Study Date:       12/30/2022     PHYSICIAN INTERPRETATION:  Left Ventricle: The left ventricular systolic function is hyperdynamic, with an estimated ejection fraction of 70-75%. There are no regional wall motion abnormalities. The left ventricular cavity size is normal. Spectral Doppler shows a normal pattern of left ventricular diastolic filling.  Left Atrium: The left atrium is normal in size. There is no evidence of a patent foramen ovale. A bubble study using agitated saline was performed. Bubble study is negative.  Right Ventricle: The right ventricle is normal in size. There is normal right ventricular global systolic function.  Right Atrium: The right atrium was not well visualized.  Aortic Valve: The aortic valve appears structurally normal. There is no evidence of aortic valve regurgitation. The peak instantaneous gradient of the aortic valve is 9.9 mmHg.  Mitral Valve: The mitral valve is normal in structure. There is no evidence of mitral valve  regurgitation.  Tricuspid Valve: The tricuspid valve is structurally normal. There is trace tricuspid regurgitation. The Doppler estimated RVSP is within normal limits at 23.2 mmHg.  Pulmonic Valve: The pulmonic valve is structurally normal. There is physiologic pulmonic valve regurgitation.  Pericardium: There is no pericardial effusion noted.  Aorta: The aortic root is normal.  In comparison to the previous echocardiogram(s): There are no prior studies on this patient for comparison purposes.        CONCLUSIONS:   1. Left ventricular systolic function is hyperdynamic with a 70-75% estimated ejection fraction.   2. RVSP within normal limits.   3. There is no evidence of a patent foramen ovale.    Show images for Echocardiogram     EKG 12/28/22       Assessment and Plan:     Neuro:   History of aneurysmal subarachnoid hemorrhage 12/29/22 s/p left pericallosal GENA aneurysm coiling. Now with increased size of the base remnant on MRA. Scheduled for surgery 12/6/23 with Dr. Luna.  The patient is at increased risk for perioperative stroke secondary to hypertension , female gender, general anesthesia, operative time >2.5 hours. Handouts for preoperative brain exercises given to patient.      HEENT/Airway  The patient has diagnoses, significant findings on chart review, clinical presentation or evaluation of obesity    Cardiovascular  The patient is scheduled for non-cardiac surgery associated with elevated risk.  The patient has no major cardiac contraindications to non- cardiac surgery.  RCRI  The patient meets 0-1 RCRI criteria and therefore has a less than 1% risk of major adverse cardiac complications.  METS  The patient's functional capacity capacity is greater than 4 METS.  EKG  The patient has no EKG or echocardiographic changes concerning for myocardial ischemia.  No further cardiac evaluation is indicated  Heart Failure  The patient has no known history of heart failure.  Additionally, the patient reports no  symptoms of heart failure and demonstrates no signs of heart failure.  Hypertension Evaluation  The patient has a known history of hypertension that is controlled.  Patient's hypertension is most consistent with stage 1.  Heart Rhythm Evaluation  The patient has no history of arrhythmias.  Heart Valve Evaluation  The patient has no known history of valvular heart disease. The patient has no symptoms or physical exam findings to suggest valvular heart disease.  CARDS EVAL  The patient follows with cardiology, Dr. Bert Nunez for hypertension. Patient was last seen 5/11/23.   The patient has a 30-day risk for MACE of 1 predictor, 6.0% risk for cardiac death, nonfatal myocardial infarction, and nonfatal cardiac arrest.  STAN score which indicates a 0.2% risk of intraoperative or 30-day postoperative.    Pulmonary   No significant findings on chart review or clinical presentation and evaluation. The patient is at increased risk of perioperative pulmonary complications secondary to neurosurgery, morbid obesity.  The patient has a stop bang score of 3, which places patient at intermediate risk for having JONNA.  ARISCAT 26, Intermediate, 13.3% risk of in-hospital postoperative pulmonary complications  PRODIGY 3, low of respiratory depression episode.    Hematology  No diagnoses or significant findings on chart review or clinical presentation and evaluation.  Antiplatelet management   The patient is not currently receiving antiplatelet therapy.  Anticoagulation management  The patient is not currently receiving anticoagulation therapy.,  Patient provided with DVT educational handout.    Caprini score 10, low risk of VTE  Transfusion Evaluation  A type and screen was obtained given the likelihood for perioperative transfusion of blood or blood products.    GI  No diagnoses or significant findings on chart review or clinical presentation and evaluation.  Eat 10- 0,  self-perceived oropharyngeal dysphagia scale (0-40)      Genitourinary  No diagnoses or significant findings on chart review or clinical presentation and evaluation.    Renal  The patient has no known history of chronic kidney disease. No renal diagnoses or significant findings on chart review or clinical presentation and evaluation. The patient has specific risk factors associated with increased risk of perioperative renal complications due to age greater than 55, hypertension. Preventative measures include preoperative hydration.    Musculoskeletal  No diagnoses or significant findings on chart review or clinical presentation and evaluation.    Endocrine  Diabetes Evaluation  The patient has no history of diabetes mellitus  Thyroid Disease Evaluation  The patient has no history of thyroid disease.    ID  No diagnoses or significant findings on chart review or clinical presentation and evaluation.    -Preoperative medication instructions were provided and reviewed with the patient.  Any additional testing or evaluation was explained to the patient.  NPO Instructions were discussed, and the patient's questions were answered prior to conclusion of this encounter

## 2023-11-17 DIAGNOSIS — Z01.818 PREOPERATIVE TESTING: ICD-10-CM

## 2023-11-17 DIAGNOSIS — K76.89 LIVER CYST: Primary | ICD-10-CM

## 2023-11-17 LAB
CHOLEST SERPL-MCNC: 172 MG/DL (ref 0–199)
CHOLESTEROL/HDL RATIO: 3.2
EST. AVERAGE GLUCOSE BLD GHB EST-MCNC: 114 MG/DL
HBA1C MFR BLD: 5.6 %
HDLC SERPL-MCNC: 54.1 MG/DL
LDLC SERPL CALC-MCNC: 96 MG/DL
NON HDL CHOLESTEROL: 118 MG/DL (ref 0–149)
STAPHYLOCOCCUS SPEC CULT: ABNORMAL
TRIGL SERPL-MCNC: 111 MG/DL (ref 0–149)
VLDL: 22 MG/DL (ref 0–40)

## 2023-12-04 ENCOUNTER — TELEPHONE (OUTPATIENT)
Dept: NEUROSURGERY | Facility: HOSPITAL | Age: 58
End: 2023-12-04
Payer: COMMERCIAL

## 2023-12-04 NOTE — TELEPHONE ENCOUNTER
Returned Mrs. Zapata's call with question if she needs to stop Potassium medication before procedure with Dr. Luna. She can continue to take potassium up until the morning or the surgery. She agreed with the plan and will call back with any further questions or needs.     ----- Message from Rody Goins MA sent at 12/4/2023  9:34 AM EST -----  Regarding: MEDICATION QUESTION  Patient would like to know if she can take her potassium prior to surgery.  Sx is 12/6/23 with Dr. Luna.  Please call back to advise [phone]

## 2023-12-05 ENCOUNTER — ANESTHESIA EVENT (OUTPATIENT)
Dept: OPERATING ROOM | Facility: HOSPITAL | Age: 58
DRG: 025 | End: 2023-12-05
Payer: COMMERCIAL

## 2023-12-06 ENCOUNTER — APPOINTMENT (OUTPATIENT)
Dept: RADIOLOGY | Facility: HOSPITAL | Age: 58
DRG: 025 | End: 2023-12-06
Payer: COMMERCIAL

## 2023-12-06 ENCOUNTER — ANESTHESIA (OUTPATIENT)
Dept: OPERATING ROOM | Facility: HOSPITAL | Age: 58
DRG: 025 | End: 2023-12-06
Payer: COMMERCIAL

## 2023-12-06 ENCOUNTER — APPOINTMENT (OUTPATIENT)
Dept: NEUROLOGY | Facility: HOSPITAL | Age: 58
DRG: 025 | End: 2023-12-06
Payer: COMMERCIAL

## 2023-12-06 ENCOUNTER — HOSPITAL ENCOUNTER (INPATIENT)
Facility: HOSPITAL | Age: 58
LOS: 3 days | Discharge: HOME | DRG: 025 | End: 2023-12-09
Attending: NEUROLOGICAL SURGERY | Admitting: NEUROLOGICAL SURGERY
Payer: COMMERCIAL

## 2023-12-06 DIAGNOSIS — I67.1: Primary | ICD-10-CM

## 2023-12-06 LAB
ALBUMIN SERPL BCP-MCNC: 3.9 G/DL (ref 3.4–5)
ANION GAP SERPL CALC-SCNC: 16 MMOL/L (ref 10–20)
APTT PPP: 28 SECONDS (ref 27–38)
BUN SERPL-MCNC: 20 MG/DL (ref 6–23)
CALCIUM SERPL-MCNC: 8.6 MG/DL (ref 8.6–10.6)
CHLORIDE SERPL-SCNC: 104 MMOL/L (ref 98–107)
CO2 SERPL-SCNC: 25 MMOL/L (ref 21–32)
CREAT SERPL-MCNC: 0.8 MG/DL (ref 0.5–1.05)
ERYTHROCYTE [DISTWIDTH] IN BLOOD BY AUTOMATED COUNT: 14.2 % (ref 11.5–14.5)
GFR SERPL CREATININE-BSD FRML MDRD: 85 ML/MIN/1.73M*2
GLUCOSE SERPL-MCNC: 164 MG/DL (ref 74–99)
HCT VFR BLD AUTO: 34 % (ref 36–46)
HGB BLD-MCNC: 10.7 G/DL (ref 12–16)
INR PPP: 1.2 (ref 0.9–1.1)
MCH RBC QN AUTO: 25 PG (ref 26–34)
MCHC RBC AUTO-ENTMCNC: 31.5 G/DL (ref 32–36)
MCV RBC AUTO: 79 FL (ref 80–100)
NRBC BLD-RTO: 0 /100 WBCS (ref 0–0)
PHOSPHATE SERPL-MCNC: 4.7 MG/DL (ref 2.5–4.9)
PLATELET # BLD AUTO: 274 X10*3/UL (ref 150–450)
POTASSIUM SERPL-SCNC: 3.3 MMOL/L (ref 3.5–5.3)
PROTHROMBIN TIME: 13.1 SECONDS (ref 9.8–12.8)
RBC # BLD AUTO: 4.28 X10*6/UL (ref 4–5.2)
SODIUM SERPL-SCNC: 142 MMOL/L (ref 136–145)
WBC # BLD AUTO: 14.6 X10*3/UL (ref 4.4–11.3)

## 2023-12-06 PROCEDURE — 3700000001 HC GENERAL ANESTHESIA TIME - INITIAL BASE CHARGE: Performed by: NEUROLOGICAL SURGERY

## 2023-12-06 PROCEDURE — 2500000005 HC RX 250 GENERAL PHARMACY W/O HCPCS: Performed by: STUDENT IN AN ORGANIZED HEALTH CARE EDUCATION/TRAINING PROGRAM

## 2023-12-06 PROCEDURE — 2780000003 HC OR 278 NO HCPCS: Performed by: NEUROLOGICAL SURGERY

## 2023-12-06 PROCEDURE — 85610 PROTHROMBIN TIME: CPT | Performed by: STUDENT IN AN ORGANIZED HEALTH CARE EDUCATION/TRAINING PROGRAM

## 2023-12-06 PROCEDURE — 95955 EEG DURING SURGERY: CPT | Performed by: PSYCHIATRY & NEUROLOGY

## 2023-12-06 PROCEDURE — 2500000005 HC RX 250 GENERAL PHARMACY W/O HCPCS: Performed by: ANESTHESIOLOGIST ASSISTANT

## 2023-12-06 PROCEDURE — 2060000001 HC INTERMEDIATE ICU ROOM DAILY

## 2023-12-06 PROCEDURE — 70450 CT HEAD/BRAIN W/O DYE: CPT | Performed by: RADIOLOGY

## 2023-12-06 PROCEDURE — C1713 ANCHOR/SCREW BN/BN,TIS/BN: HCPCS | Performed by: NEUROLOGICAL SURGERY

## 2023-12-06 PROCEDURE — 2500000001 HC RX 250 WO HCPCS SELF ADMINISTERED DRUGS (ALT 637 FOR MEDICARE OP): Performed by: NEUROLOGICAL SURGERY

## 2023-12-06 PROCEDURE — 3600000005 HC OR TIME - INITIAL BASE CHARGE - PROCEDURE LEVEL FIVE: Performed by: NEUROLOGICAL SURGERY

## 2023-12-06 PROCEDURE — G0453 CONT INTRAOP NEURO MONITOR: HCPCS | Performed by: PSYCHIATRY & NEUROLOGY

## 2023-12-06 PROCEDURE — 36415 COLL VENOUS BLD VENIPUNCTURE: CPT | Performed by: STUDENT IN AN ORGANIZED HEALTH CARE EDUCATION/TRAINING PROGRAM

## 2023-12-06 PROCEDURE — 2500000005 HC RX 250 GENERAL PHARMACY W/O HCPCS: Performed by: NEUROLOGICAL SURGERY

## 2023-12-06 PROCEDURE — 2500000004 HC RX 250 GENERAL PHARMACY W/ HCPCS (ALT 636 FOR OP/ED): Performed by: STUDENT IN AN ORGANIZED HEALTH CARE EDUCATION/TRAINING PROGRAM

## 2023-12-06 PROCEDURE — 2500000004 HC RX 250 GENERAL PHARMACY W/ HCPCS (ALT 636 FOR OP/ED): Performed by: NEUROLOGICAL SURGERY

## 2023-12-06 PROCEDURE — 95940 IONM IN OPERATNG ROOM 15 MIN: CPT

## 2023-12-06 PROCEDURE — 85027 COMPLETE CBC AUTOMATED: CPT | Performed by: STUDENT IN AN ORGANIZED HEALTH CARE EDUCATION/TRAINING PROGRAM

## 2023-12-06 PROCEDURE — 7100000002 HC RECOVERY ROOM TIME - EACH INCREMENTAL 1 MINUTE: Performed by: NEUROLOGICAL SURGERY

## 2023-12-06 PROCEDURE — 36620 INSERTION CATHETER ARTERY: CPT | Performed by: STUDENT IN AN ORGANIZED HEALTH CARE EDUCATION/TRAINING PROGRAM

## 2023-12-06 PROCEDURE — 84100 ASSAY OF PHOSPHORUS: CPT | Performed by: STUDENT IN AN ORGANIZED HEALTH CARE EDUCATION/TRAINING PROGRAM

## 2023-12-06 PROCEDURE — 94760 N-INVAS EAR/PLS OXIMETRY 1: CPT

## 2023-12-06 PROCEDURE — 2500000004 HC RX 250 GENERAL PHARMACY W/ HCPCS (ALT 636 FOR OP/ED): Performed by: ANESTHESIOLOGIST ASSISTANT

## 2023-12-06 PROCEDURE — 69990 MICROSURGERY ADD-ON: CPT | Performed by: NEUROLOGICAL SURGERY

## 2023-12-06 PROCEDURE — 03LG0CZ OCCLUSION OF INTRACRANIAL ARTERY WITH EXTRALUMINAL DEVICE, OPEN APPROACH: ICD-10-PCS | Performed by: NEUROLOGICAL SURGERY

## 2023-12-06 PROCEDURE — 2720000007 HC OR 272 NO HCPCS: Performed by: NEUROLOGICAL SURGERY

## 2023-12-06 PROCEDURE — 3600000010 HC OR TIME - EACH INCREMENTAL 1 MINUTE - PROCEDURE LEVEL FIVE: Performed by: NEUROLOGICAL SURGERY

## 2023-12-06 PROCEDURE — 61697 BRAIN ANEURYSM REPR COMPLX: CPT | Performed by: NEUROLOGICAL SURGERY

## 2023-12-06 PROCEDURE — C1889 IMPLANT/INSERT DEVICE, NOC: HCPCS | Performed by: NEUROLOGICAL SURGERY

## 2023-12-06 PROCEDURE — 70450 CT HEAD/BRAIN W/O DYE: CPT

## 2023-12-06 PROCEDURE — 96372 THER/PROPH/DIAG INJ SC/IM: CPT | Performed by: NEUROLOGICAL SURGERY

## 2023-12-06 PROCEDURE — 2500000001 HC RX 250 WO HCPCS SELF ADMINISTERED DRUGS (ALT 637 FOR MEDICARE OP): Performed by: STUDENT IN AN ORGANIZED HEALTH CARE EDUCATION/TRAINING PROGRAM

## 2023-12-06 PROCEDURE — 3700000002 HC GENERAL ANESTHESIA TIME - EACH INCREMENTAL 1 MINUTE: Performed by: NEUROLOGICAL SURGERY

## 2023-12-06 PROCEDURE — 7100000001 HC RECOVERY ROOM TIME - INITIAL BASE CHARGE: Performed by: NEUROLOGICAL SURGERY

## 2023-12-06 PROCEDURE — 95938 SOMATOSENSORY TESTING: CPT | Performed by: PSYCHIATRY & NEUROLOGY

## 2023-12-06 PROCEDURE — 2500000004 HC RX 250 GENERAL PHARMACY W/ HCPCS (ALT 636 FOR OP/ED)

## 2023-12-06 PROCEDURE — A4217 STERILE WATER/SALINE, 500 ML: HCPCS | Performed by: NEUROLOGICAL SURGERY

## 2023-12-06 DEVICE — IMPLANTABLE DEVICE: Type: IMPLANTABLE DEVICE | Site: CRANIAL | Status: FUNCTIONAL

## 2023-12-06 DEVICE — DURAGEN® PLUS DURAL REGENERATION MATRIX, 3 IN X 3 IN (7.5 CM X 7.5 CM)
Type: IMPLANTABLE DEVICE | Site: BRAIN | Status: FUNCTIONAL
Brand: DURAGEN® PLUS

## 2023-12-06 DEVICE — MESH, 1.7 - 90 X 90 X 0.6: Type: IMPLANTABLE DEVICE | Site: CRANIAL | Status: FUNCTIONAL

## 2023-12-06 DEVICE — IMPLANTABLE DEVICE: Type: IMPLANTABLE DEVICE | Site: CRANIAL | Status: NON-FUNCTIONAL

## 2023-12-06 DEVICE — CROSS PIN, AXS SELF-TAP, 1.5 X 4MM: Type: IMPLANTABLE DEVICE | Site: CRANIAL | Status: FUNCTIONAL

## 2023-12-06 DEVICE — SCREW, EMERG AXS 1.7 X 4MM: Type: IMPLANTABLE DEVICE | Site: CRANIAL | Status: FUNCTIONAL

## 2023-12-06 RX ORDER — CHLORTHALIDONE 25 MG/1
25 TABLET ORAL DAILY
Status: DISCONTINUED | OUTPATIENT
Start: 2023-12-06 | End: 2023-12-09 | Stop reason: HOSPADM

## 2023-12-06 RX ORDER — SODIUM CHLORIDE 9 MG/ML
75 INJECTION, SOLUTION INTRAVENOUS CONTINUOUS
Status: DISCONTINUED | OUTPATIENT
Start: 2023-12-06 | End: 2023-12-08

## 2023-12-06 RX ORDER — OXYCODONE HYDROCHLORIDE 5 MG/1
10 TABLET ORAL EVERY 4 HOURS PRN
Status: DISCONTINUED | OUTPATIENT
Start: 2023-12-06 | End: 2023-12-09 | Stop reason: HOSPADM

## 2023-12-06 RX ORDER — ONDANSETRON 4 MG/1
4 TABLET, FILM COATED ORAL EVERY 8 HOURS PRN
Status: DISCONTINUED | OUTPATIENT
Start: 2023-12-06 | End: 2023-12-07

## 2023-12-06 RX ORDER — MIDAZOLAM HYDROCHLORIDE 1 MG/ML
INJECTION INTRAMUSCULAR; INTRAVENOUS AS NEEDED
Status: DISCONTINUED | OUTPATIENT
Start: 2023-12-06 | End: 2023-12-06

## 2023-12-06 RX ORDER — LIDOCAINE HYDROCHLORIDE 20 MG/ML
INJECTION, SOLUTION INFILTRATION; PERINEURAL AS NEEDED
Status: DISCONTINUED | OUTPATIENT
Start: 2023-12-06 | End: 2023-12-06

## 2023-12-06 RX ORDER — HYDROMORPHONE HYDROCHLORIDE 1 MG/ML
INJECTION, SOLUTION INTRAMUSCULAR; INTRAVENOUS; SUBCUTANEOUS AS NEEDED
Status: DISCONTINUED | OUTPATIENT
Start: 2023-12-06 | End: 2023-12-06

## 2023-12-06 RX ORDER — HYDROMORPHONE HYDROCHLORIDE 1 MG/ML
0.2 INJECTION, SOLUTION INTRAMUSCULAR; INTRAVENOUS; SUBCUTANEOUS EVERY 4 HOURS PRN
Status: DISCONTINUED | OUTPATIENT
Start: 2023-12-06 | End: 2023-12-09 | Stop reason: HOSPADM

## 2023-12-06 RX ORDER — LABETALOL HYDROCHLORIDE 5 MG/ML
5 INJECTION, SOLUTION INTRAVENOUS ONCE AS NEEDED
Status: DISCONTINUED | OUTPATIENT
Start: 2023-12-06 | End: 2023-12-06 | Stop reason: HOSPADM

## 2023-12-06 RX ORDER — HYDROMORPHONE HYDROCHLORIDE 1 MG/ML
0.2 INJECTION, SOLUTION INTRAMUSCULAR; INTRAVENOUS; SUBCUTANEOUS EVERY 5 MIN PRN
Status: DISCONTINUED | OUTPATIENT
Start: 2023-12-06 | End: 2023-12-06 | Stop reason: HOSPADM

## 2023-12-06 RX ORDER — POLYETHYLENE GLYCOL 3350 17 G/17G
17 POWDER, FOR SOLUTION ORAL DAILY
Status: DISCONTINUED | OUTPATIENT
Start: 2023-12-06 | End: 2023-12-09 | Stop reason: HOSPADM

## 2023-12-06 RX ORDER — PROPOFOL 10 MG/ML
INJECTION, EMULSION INTRAVENOUS AS NEEDED
Status: DISCONTINUED | OUTPATIENT
Start: 2023-12-06 | End: 2023-12-06

## 2023-12-06 RX ORDER — ROCURONIUM BROMIDE 10 MG/ML
INJECTION, SOLUTION INTRAVENOUS AS NEEDED
Status: DISCONTINUED | OUTPATIENT
Start: 2023-12-06 | End: 2023-12-06

## 2023-12-06 RX ORDER — NALOXONE HYDROCHLORIDE 0.4 MG/ML
0.2 INJECTION, SOLUTION INTRAMUSCULAR; INTRAVENOUS; SUBCUTANEOUS EVERY 5 MIN PRN
Status: DISCONTINUED | OUTPATIENT
Start: 2023-12-06 | End: 2023-12-09 | Stop reason: HOSPADM

## 2023-12-06 RX ORDER — ESMOLOL HYDROCHLORIDE 10 MG/ML
INJECTION INTRAVENOUS AS NEEDED
Status: DISCONTINUED | OUTPATIENT
Start: 2023-12-06 | End: 2023-12-06

## 2023-12-06 RX ORDER — OXYCODONE HYDROCHLORIDE 5 MG/1
5 TABLET ORAL EVERY 4 HOURS PRN
Status: DISCONTINUED | OUTPATIENT
Start: 2023-12-06 | End: 2023-12-09 | Stop reason: HOSPADM

## 2023-12-06 RX ORDER — ONDANSETRON HYDROCHLORIDE 2 MG/ML
4 INJECTION, SOLUTION INTRAVENOUS ONCE AS NEEDED
Status: DISCONTINUED | OUTPATIENT
Start: 2023-12-06 | End: 2023-12-06 | Stop reason: HOSPADM

## 2023-12-06 RX ORDER — LEVETIRACETAM 500 MG/5ML
INJECTION, SOLUTION, CONCENTRATE INTRAVENOUS AS NEEDED
Status: DISCONTINUED | OUTPATIENT
Start: 2023-12-06 | End: 2023-12-06

## 2023-12-06 RX ORDER — FENTANYL CITRATE 50 UG/ML
INJECTION, SOLUTION INTRAMUSCULAR; INTRAVENOUS AS NEEDED
Status: DISCONTINUED | OUTPATIENT
Start: 2023-12-06 | End: 2023-12-06

## 2023-12-06 RX ORDER — ONDANSETRON HYDROCHLORIDE 2 MG/ML
4 INJECTION, SOLUTION INTRAVENOUS EVERY 8 HOURS PRN
Status: DISCONTINUED | OUTPATIENT
Start: 2023-12-06 | End: 2023-12-07

## 2023-12-06 RX ORDER — DEXAMETHASONE SODIUM PHOSPHATE 4 MG/ML
INJECTION, SOLUTION INTRA-ARTICULAR; INTRALESIONAL; INTRAMUSCULAR; INTRAVENOUS; SOFT TISSUE AS NEEDED
Status: DISCONTINUED | OUTPATIENT
Start: 2023-12-06 | End: 2023-12-06

## 2023-12-06 RX ORDER — PROPOFOL 10 MG/ML
INJECTION, EMULSION INTRAVENOUS CONTINUOUS PRN
Status: DISCONTINUED | OUTPATIENT
Start: 2023-12-06 | End: 2023-12-06

## 2023-12-06 RX ORDER — OXYCODONE HYDROCHLORIDE 5 MG/1
10 TABLET ORAL EVERY 4 HOURS PRN
Status: DISCONTINUED | OUTPATIENT
Start: 2023-12-06 | End: 2023-12-06 | Stop reason: HOSPADM

## 2023-12-06 RX ORDER — SODIUM CHLORIDE, SODIUM LACTATE, POTASSIUM CHLORIDE, CALCIUM CHLORIDE 600; 310; 30; 20 MG/100ML; MG/100ML; MG/100ML; MG/100ML
INJECTION, SOLUTION INTRAVENOUS CONTINUOUS PRN
Status: DISCONTINUED | OUTPATIENT
Start: 2023-12-06 | End: 2023-12-06

## 2023-12-06 RX ORDER — ACETAMINOPHEN 325 MG/1
650 TABLET ORAL EVERY 6 HOURS
Status: DISCONTINUED | OUTPATIENT
Start: 2023-12-06 | End: 2023-12-09 | Stop reason: HOSPADM

## 2023-12-06 RX ORDER — ONDANSETRON HYDROCHLORIDE 2 MG/ML
INJECTION, SOLUTION INTRAVENOUS
Status: COMPLETED
Start: 2023-12-06 | End: 2023-12-06

## 2023-12-06 RX ORDER — PAPAVERINE HYDROCHLORIDE 30 MG/ML
INJECTION INTRAMUSCULAR; INTRAVENOUS AS NEEDED
Status: DISCONTINUED | OUTPATIENT
Start: 2023-12-06 | End: 2023-12-06 | Stop reason: HOSPADM

## 2023-12-06 RX ORDER — MANNITOL 20 G/100ML
INJECTION, SOLUTION INTRAVENOUS CONTINUOUS PRN
Status: DISCONTINUED | OUTPATIENT
Start: 2023-12-06 | End: 2023-12-06

## 2023-12-06 RX ORDER — HYDRALAZINE HYDROCHLORIDE 20 MG/ML
10 INJECTION INTRAMUSCULAR; INTRAVENOUS
Status: DISCONTINUED | OUTPATIENT
Start: 2023-12-06 | End: 2023-12-09 | Stop reason: HOSPADM

## 2023-12-06 RX ORDER — OXYCODONE HYDROCHLORIDE 5 MG/1
2.5 TABLET ORAL EVERY 4 HOURS PRN
Status: DISCONTINUED | OUTPATIENT
Start: 2023-12-06 | End: 2023-12-09 | Stop reason: HOSPADM

## 2023-12-06 RX ORDER — HYDROMORPHONE HYDROCHLORIDE 1 MG/ML
0.5 INJECTION, SOLUTION INTRAMUSCULAR; INTRAVENOUS; SUBCUTANEOUS EVERY 5 MIN PRN
Status: DISCONTINUED | OUTPATIENT
Start: 2023-12-06 | End: 2023-12-06 | Stop reason: HOSPADM

## 2023-12-06 RX ORDER — OXYCODONE HYDROCHLORIDE 5 MG/1
5 TABLET ORAL EVERY 4 HOURS PRN
Status: DISCONTINUED | OUTPATIENT
Start: 2023-12-06 | End: 2023-12-06 | Stop reason: HOSPADM

## 2023-12-06 RX ORDER — SODIUM CHLORIDE, SODIUM LACTATE, POTASSIUM CHLORIDE, CALCIUM CHLORIDE 600; 310; 30; 20 MG/100ML; MG/100ML; MG/100ML; MG/100ML
100 INJECTION, SOLUTION INTRAVENOUS CONTINUOUS
Status: DISCONTINUED | OUTPATIENT
Start: 2023-12-06 | End: 2023-12-06 | Stop reason: HOSPADM

## 2023-12-06 RX ORDER — INDOCYANINE GREEN AND WATER 25 MG
KIT INJECTION AS NEEDED
Status: DISCONTINUED | OUTPATIENT
Start: 2023-12-06 | End: 2023-12-06

## 2023-12-06 RX ORDER — LORAZEPAM 0.5 MG/1
0.5 TABLET ORAL EVERY 8 HOURS PRN
Status: DISCONTINUED | OUTPATIENT
Start: 2023-12-06 | End: 2023-12-09 | Stop reason: HOSPADM

## 2023-12-06 RX ORDER — ONDANSETRON HYDROCHLORIDE 2 MG/ML
INJECTION, SOLUTION INTRAVENOUS AS NEEDED
Status: DISCONTINUED | OUTPATIENT
Start: 2023-12-06 | End: 2023-12-06

## 2023-12-06 RX ORDER — LEVETIRACETAM 250 MG/1
500 TABLET ORAL 2 TIMES DAILY
Status: DISCONTINUED | OUTPATIENT
Start: 2023-12-07 | End: 2023-12-09 | Stop reason: HOSPADM

## 2023-12-06 RX ORDER — SODIUM CHLORIDE 0.9 G/100ML
IRRIGANT IRRIGATION AS NEEDED
Status: DISCONTINUED | OUTPATIENT
Start: 2023-12-06 | End: 2023-12-06 | Stop reason: HOSPADM

## 2023-12-06 RX ORDER — LABETALOL HYDROCHLORIDE 5 MG/ML
10 INJECTION, SOLUTION INTRAVENOUS EVERY 10 MIN PRN
Status: DISCONTINUED | OUTPATIENT
Start: 2023-12-06 | End: 2023-12-09 | Stop reason: HOSPADM

## 2023-12-06 RX ORDER — LIDOCAINE HYDROCHLORIDE AND EPINEPHRINE 5; 5 MG/ML; UG/ML
INJECTION, SOLUTION INFILTRATION; PERINEURAL AS NEEDED
Status: DISCONTINUED | OUTPATIENT
Start: 2023-12-06 | End: 2023-12-06 | Stop reason: HOSPADM

## 2023-12-06 RX ORDER — PHENYLEPHRINE HCL IN 0.9% NACL 0.4MG/10ML
SYRINGE (ML) INTRAVENOUS AS NEEDED
Status: DISCONTINUED | OUTPATIENT
Start: 2023-12-06 | End: 2023-12-06

## 2023-12-06 RX ORDER — CEFAZOLIN 1 G/1
INJECTION, POWDER, FOR SOLUTION INTRAVENOUS AS NEEDED
Status: DISCONTINUED | OUTPATIENT
Start: 2023-12-06 | End: 2023-12-06

## 2023-12-06 RX ORDER — BACITRACIN 500 [USP'U]/G
OINTMENT TOPICAL AS NEEDED
Status: DISCONTINUED | OUTPATIENT
Start: 2023-12-06 | End: 2023-12-06 | Stop reason: HOSPADM

## 2023-12-06 RX ORDER — NIFEDIPINE 30 MG/1
30 TABLET, FILM COATED, EXTENDED RELEASE ORAL DAILY
Status: DISCONTINUED | OUTPATIENT
Start: 2023-12-06 | End: 2023-12-09 | Stop reason: HOSPADM

## 2023-12-06 RX ADMIN — ROCURONIUM BROMIDE 20 MG: 10 INJECTION INTRAVENOUS at 13:13

## 2023-12-06 RX ADMIN — ROCURONIUM BROMIDE 20 MG: 10 INJECTION INTRAVENOUS at 13:56

## 2023-12-06 RX ADMIN — INDOCYANINE GREEN 25 MG: KIT INTRAVENOUS at 14:27

## 2023-12-06 RX ADMIN — ROCURONIUM BROMIDE 50 MG: 10 INJECTION INTRAVENOUS at 08:27

## 2023-12-06 RX ADMIN — LIDOCAINE HYDROCHLORIDE 50 MG: 20 INJECTION, SOLUTION INFILTRATION; PERINEURAL at 15:43

## 2023-12-06 RX ADMIN — ONDANSETRON HYDROCHLORIDE 4 MG: 2 INJECTION, SOLUTION INTRAVENOUS at 20:44

## 2023-12-06 RX ADMIN — SUGAMMADEX 200 MG: 100 INJECTION, SOLUTION INTRAVENOUS at 15:55

## 2023-12-06 RX ADMIN — SODIUM CHLORIDE: 9 INJECTION, SOLUTION INTRAVENOUS at 14:36

## 2023-12-06 RX ADMIN — CEFAZOLIN 2 G: 1 INJECTION, POWDER, FOR SOLUTION INTRAMUSCULAR; INTRAVENOUS at 12:48

## 2023-12-06 RX ADMIN — HYDROMORPHONE HYDROCHLORIDE 0.2 MG: 1 INJECTION, SOLUTION INTRAMUSCULAR; INTRAVENOUS; SUBCUTANEOUS at 15:38

## 2023-12-06 RX ADMIN — SODIUM CHLORIDE, POTASSIUM CHLORIDE, SODIUM LACTATE AND CALCIUM CHLORIDE: 600; 310; 30; 20 INJECTION, SOLUTION INTRAVENOUS at 08:10

## 2023-12-06 RX ADMIN — ROCURONIUM BROMIDE 20 MG: 10 INJECTION INTRAVENOUS at 12:31

## 2023-12-06 RX ADMIN — PROPOFOL 150 MG: 10 INJECTION, EMULSION INTRAVENOUS at 08:27

## 2023-12-06 RX ADMIN — FENTANYL CITRATE 50 MCG: 50 INJECTION, SOLUTION INTRAMUSCULAR; INTRAVENOUS at 08:27

## 2023-12-06 RX ADMIN — ROCURONIUM BROMIDE 20 MG: 10 INJECTION INTRAVENOUS at 10:41

## 2023-12-06 RX ADMIN — ROCURONIUM BROMIDE 20 MG: 10 INJECTION INTRAVENOUS at 11:43

## 2023-12-06 RX ADMIN — SODIUM CHLORIDE 75 ML/HR: 9 INJECTION, SOLUTION INTRAVENOUS at 20:45

## 2023-12-06 RX ADMIN — ONDANSETRON 4 MG: 2 INJECTION INTRAMUSCULAR; INTRAVENOUS at 15:45

## 2023-12-06 RX ADMIN — Medication 40 MCG: at 09:07

## 2023-12-06 RX ADMIN — LEVETIRACETAM 1000 MG: 500 INJECTION, SOLUTION, CONCENTRATE INTRAVENOUS at 15:16

## 2023-12-06 RX ADMIN — LIDOCAINE HYDROCHLORIDE 80 MG: 20 INJECTION, SOLUTION INFILTRATION; PERINEURAL at 08:27

## 2023-12-06 RX ADMIN — PROPOFOL 75 MCG/KG/MIN: 10 INJECTION, EMULSION INTRAVENOUS at 08:38

## 2023-12-06 RX ADMIN — ESMOLOL HYDROCHLORIDE 40 MG: 10 INJECTION, SOLUTION INTRAVENOUS at 09:21

## 2023-12-06 RX ADMIN — HYDROMORPHONE HYDROCHLORIDE 0.2 MG: 1 INJECTION, SOLUTION INTRAMUSCULAR; INTRAVENOUS; SUBCUTANEOUS at 15:26

## 2023-12-06 RX ADMIN — MIDAZOLAM HYDROCHLORIDE 2 MG: 1 INJECTION, SOLUTION INTRAMUSCULAR; INTRAVENOUS at 08:12

## 2023-12-06 RX ADMIN — ONDANSETRON 4 MG: 2 INJECTION INTRAMUSCULAR; INTRAVENOUS at 20:44

## 2023-12-06 RX ADMIN — HYDROMORPHONE HYDROCHLORIDE 0.2 MG: 1 INJECTION, SOLUTION INTRAMUSCULAR; INTRAVENOUS; SUBCUTANEOUS at 15:34

## 2023-12-06 RX ADMIN — SODIUM CHLORIDE: 9 INJECTION, SOLUTION INTRAVENOUS at 08:10

## 2023-12-06 RX ADMIN — HYDROMORPHONE HYDROCHLORIDE 0.5 MG: 1 INJECTION, SOLUTION INTRAMUSCULAR; INTRAVENOUS; SUBCUTANEOUS at 18:18

## 2023-12-06 RX ADMIN — CHLORTHALIDONE 25 MG: 25 TABLET ORAL at 21:26

## 2023-12-06 RX ADMIN — HYDROMORPHONE HYDROCHLORIDE 0.2 MG: 1 INJECTION, SOLUTION INTRAMUSCULAR; INTRAVENOUS; SUBCUTANEOUS at 15:16

## 2023-12-06 RX ADMIN — PROPOFOL 50 MG: 10 INJECTION, EMULSION INTRAVENOUS at 09:21

## 2023-12-06 RX ADMIN — CEFAZOLIN 2 G: 1 INJECTION, POWDER, FOR SOLUTION INTRAMUSCULAR; INTRAVENOUS at 08:48

## 2023-12-06 RX ADMIN — ROCURONIUM BROMIDE 30 MG: 10 INJECTION INTRAVENOUS at 09:37

## 2023-12-06 RX ADMIN — NIFEDIPINE 30 MG: 30 TABLET, FILM COATED, EXTENDED RELEASE ORAL at 21:26

## 2023-12-06 RX ADMIN — Medication 80 MCG: at 09:03

## 2023-12-06 RX ADMIN — DEXAMETHASONE SODIUM PHOSPHATE 10 MG: 4 INJECTION, SOLUTION INTRA-ARTICULAR; INTRALESIONAL; INTRAMUSCULAR; INTRAVENOUS; SOFT TISSUE at 09:03

## 2023-12-06 RX ADMIN — MANNITOL: 20 INJECTION, SOLUTION INTRAVENOUS at 08:10

## 2023-12-06 RX ADMIN — Medication 80 MCG: at 08:57

## 2023-12-06 RX ADMIN — ACETAMINOPHEN 650 MG: 325 TABLET ORAL at 21:26

## 2023-12-06 RX ADMIN — FENTANYL CITRATE 50 MCG: 50 INJECTION, SOLUTION INTRAMUSCULAR; INTRAVENOUS at 08:44

## 2023-12-06 RX ADMIN — Medication 40 MCG: at 08:53

## 2023-12-06 SDOH — SOCIAL STABILITY: SOCIAL INSECURITY: HAS ANYONE EVER THREATENED TO HURT YOUR FAMILY OR YOUR PETS?: NO

## 2023-12-06 SDOH — SOCIAL STABILITY: SOCIAL INSECURITY: DO YOU FEEL UNSAFE GOING BACK TO THE PLACE WHERE YOU ARE LIVING?: NO

## 2023-12-06 SDOH — SOCIAL STABILITY: SOCIAL INSECURITY: WERE YOU ABLE TO COMPLETE ALL THE BEHAVIORAL HEALTH SCREENINGS?: YES

## 2023-12-06 SDOH — HEALTH STABILITY: MENTAL HEALTH: CURRENT SMOKER: 0

## 2023-12-06 SDOH — SOCIAL STABILITY: SOCIAL INSECURITY: ARE THERE ANY APPARENT SIGNS OF INJURIES/BEHAVIORS THAT COULD BE RELATED TO ABUSE/NEGLECT?: NO

## 2023-12-06 SDOH — SOCIAL STABILITY: SOCIAL INSECURITY: DOES ANYONE TRY TO KEEP YOU FROM HAVING/CONTACTING OTHER FRIENDS OR DOING THINGS OUTSIDE YOUR HOME?: NO

## 2023-12-06 SDOH — SOCIAL STABILITY: SOCIAL INSECURITY: DO YOU FEEL ANYONE HAS EXPLOITED OR TAKEN ADVANTAGE OF YOU FINANCIALLY OR OF YOUR PERSONAL PROPERTY?: NO

## 2023-12-06 SDOH — SOCIAL STABILITY: SOCIAL INSECURITY: ARE YOU OR HAVE YOU BEEN THREATENED OR ABUSED PHYSICALLY, EMOTIONALLY, OR SEXUALLY BY ANYONE?: NO

## 2023-12-06 SDOH — SOCIAL STABILITY: SOCIAL INSECURITY: ABUSE: ADULT

## 2023-12-06 SDOH — SOCIAL STABILITY: SOCIAL INSECURITY: HAVE YOU HAD THOUGHTS OF HARMING ANYONE ELSE?: NO

## 2023-12-06 ASSESSMENT — PAIN SCALES - GENERAL
PAINLEVEL_OUTOF10: 0 - NO PAIN
PAINLEVEL_OUTOF10: 0 - NO PAIN
PAINLEVEL_OUTOF10: 10 - WORST POSSIBLE PAIN
PAINLEVEL_OUTOF10: 0 - NO PAIN
PAINLEVEL_OUTOF10: 0 - NO PAIN
PAIN_LEVEL: 2
PAINLEVEL_OUTOF10: 0 - NO PAIN
PAINLEVEL_OUTOF10: 10 - WORST POSSIBLE PAIN
PAINLEVEL_OUTOF10: 0 - NO PAIN

## 2023-12-06 ASSESSMENT — ACTIVITIES OF DAILY LIVING (ADL)
WALKS IN HOME: INDEPENDENT
DRESSING YOURSELF: INDEPENDENT
PATIENT'S MEMORY ADEQUATE TO SAFELY COMPLETE DAILY ACTIVITIES?: YES
FEEDING YOURSELF: INDEPENDENT
TOILETING: INDEPENDENT
BATHING: INDEPENDENT
ADEQUATE_TO_COMPLETE_ADL: YES
HEARING - RIGHT EAR: FUNCTIONAL
GROOMING: INDEPENDENT
LACK_OF_TRANSPORTATION: NO
JUDGMENT_ADEQUATE_SAFELY_COMPLETE_DAILY_ACTIVITIES: YES
LACK_OF_TRANSPORTATION: PATIENT DECLINED
HEARING - LEFT EAR: FUNCTIONAL

## 2023-12-06 ASSESSMENT — PAIN - FUNCTIONAL ASSESSMENT

## 2023-12-06 ASSESSMENT — COLUMBIA-SUICIDE SEVERITY RATING SCALE - C-SSRS
2. HAVE YOU ACTUALLY HAD ANY THOUGHTS OF KILLING YOURSELF?: NO
6. HAVE YOU EVER DONE ANYTHING, STARTED TO DO ANYTHING, OR PREPARED TO DO ANYTHING TO END YOUR LIFE?: NO
1. IN THE PAST MONTH, HAVE YOU WISHED YOU WERE DEAD OR WISHED YOU COULD GO TO SLEEP AND NOT WAKE UP?: NO

## 2023-12-06 ASSESSMENT — COGNITIVE AND FUNCTIONAL STATUS - GENERAL
PATIENT BASELINE BEDBOUND: NO
MOBILITY SCORE: 24
DAILY ACTIVITIY SCORE: 24

## 2023-12-06 ASSESSMENT — PAIN DESCRIPTION - LOCATION: LOCATION: HEAD

## 2023-12-06 ASSESSMENT — PATIENT HEALTH QUESTIONNAIRE - PHQ9
1. LITTLE INTEREST OR PLEASURE IN DOING THINGS: NOT AT ALL
2. FEELING DOWN, DEPRESSED OR HOPELESS: NOT AT ALL
SUM OF ALL RESPONSES TO PHQ9 QUESTIONS 1 & 2: 0

## 2023-12-06 ASSESSMENT — LIFESTYLE VARIABLES
HOW MANY STANDARD DRINKS CONTAINING ALCOHOL DO YOU HAVE ON A TYPICAL DAY: PATIENT DOES NOT DRINK
SKIP TO QUESTIONS 9-10: 1
HOW OFTEN DO YOU HAVE A DRINK CONTAINING ALCOHOL: NEVER
AUDIT-C TOTAL SCORE: 0
HOW OFTEN DO YOU HAVE 6 OR MORE DRINKS ON ONE OCCASION: NEVER
AUDIT-C TOTAL SCORE: 0

## 2023-12-06 NOTE — HOSPITAL COURSE
Mary Zapata is a 58 y.o. female admitted 12/6 w/ pmx aneurysmal subarachnoid hemorrhage on 12/29/2022 status post left pericallosal GENA aneurysm coiling. She was recently found to have increased size of the base remnant on MRA and confirmed on angiography.  She presents for perioperative evaluation in anticipation of left craniotomy for clipping of left GENA aneurysm on 12/6/23 with Dr. Luna.     On 12/6/2023 patient underwent L craniotomy for coiling of L A2-3 aneurysm.  A post operative diagnostic angiogram was performed on 12/7/2023 and demonstrated no residual aneurysm.      Patient tolerated her post operative course well, without complication.  On day of discharge patient was in satisfactory condition with follow up appointments arranged.

## 2023-12-06 NOTE — BRIEF OP NOTE
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] Post-op Diagnosis     * Anterior cerebral artery aneurysm [I67.1]     Procedures  left craniotomy for clipping of left GENA aneurysm  01777 - NV COMPLX INTRACRANIAL ARYSM CAROTID CIRCULATION  Use of operative microscope  Use of transonic flow probe     Surgeons      * Govind Luna - Primary    Resident/Fellow/Other Assistant:  Surgeon(s) and Role:     * Hellen Alonzo MD - Assisting     * Juan Milligan MD - Assisting    Procedure Summary  Anesthesia: General  ASA: III  Anesthesia Staff: Anesthesiologist: Dimitri Rhodes DO; Arsalan Macairo MD  C-AA: ROMARIO Romero; ROMARIO Montiel  Anesthesia Resident: Bill De Santiago MD  Estimated Blood Loss: 250mL  Intra-op Medications:   Medication Name Total Dose   lidocaine-epinephrine (Xylocaine W/EPI) 0.5 %-1:200,000 injection 20 mL   gelatin absorbable (Gelfoam) 100 sponge 1 each   thrombin (recombinant) (Recothrom) topical solution 10,000 Units   polymyxin B 1,000,000 Units in sodium chloride 0.9 % 1,000 mL irrigation 1,000 mL              Anesthesia Record               Intraprocedure I/O Totals          Intake    NaCl 0.9 % 1000.00 mL    mannitol 20 % 250.00 mL    Propofol Drip 0.00 mL    The total shown is the total volume documented since Anesthesia Start was filed.    Total Intake 1250 mL       Output    Urine 930 mL    Est. Blood Loss 250 mL    Total Output 1180 mL       Net    Net Volume 70 mL          Specimen: No specimens collected     Staff:   Circulator: Nancy Serra RN  Relief Circulator: Mey Andre RN  Scrub Person: Karen Ramirez RN; Mary Jane Delgado          Findings: Good clip occlusion of base remnant     Complications:  None; patient tolerated the procedure well.     Disposition: PACU - hemodynamically stable.  Condition:  stable  Specimens Collected: No specimens collected  Attending Attestation:     Govind Luna  Phone Number: [phone]

## 2023-12-06 NOTE — Clinical Note
Cerebral angiogram performed. Access via right groin. Closure with 5 fr mynx. Hemostasis achieved. 2x2 and tegaderm placed. Dressing clean, dry, and intact. No hematoma. Pt received 1 mg versed fentanyl IVP. Pt to keep right leg flat for 3 hours post deployment time. VSS. Pt transferred to [facility], [facility] RN received report.

## 2023-12-06 NOTE — ANESTHESIA PROCEDURE NOTES
Airway  Date/Time: 12/6/2023 8:53 AM  Urgency: elective    Airway not difficult    Staffing  Performed: resident   Authorized by: Arsalan Macario MD    Performed by: Bill De Santiago MD  Patient location during procedure: OR    Indications and Patient Condition  Indications for airway management: anesthesia  Spontaneous Ventilation: absent  Sedation level: deep  Preoxygenated: yes  Patient position: sniffing  Mask difficulty assessment: 1 - vent by mask  Planned trial extubation    Final Airway Details  Final airway type: endotracheal airway      Successful airway: ETT  Cuffed: yes   Successful intubation technique: video laryngoscopy  Facilitating devices/methods: intubating stylet  Endotracheal tube insertion site: oral  Blade: Phillip  Blade size: #4  ETT size (mm): 7.5  Cormack-Lehane Classification: grade IIa - partial view of glottis  Placement verified by: chest auscultation and capnometry   Inital cuff pressure (cm H2O): 10  Measured from: lips  ETT to lips (cm): 22  Number of attempts at approach: 1    Additional Comments  Salvador

## 2023-12-06 NOTE — ANESTHESIA PREPROCEDURE EVALUATION
Patient: Mary Zapata    Procedure Information       Date/Time: 23 0815    Procedure: left craniotomy for clipping of left GENA aneurysm (Left)    Location: [facility] OR 25 / [facility]    Surgeons: Govind Luna MD          The patient is a 57 year old female with history of aneurysmal subarachnoid hemorrhage on 2022 status post left pericallosal GENA aneurysm coiling. She was recently found to have increased size of the base remnant on MRA and confirmed on angiography.  She presents today for perioperative evaluation in anticipation of left craniotomy for clipping of left GENA aneurysm on 23 with Dr. Luna.     EF: 70-75%    Relevant Problems   Cardiovascular   (+) Anterior cerebral artery aneurysm   (+) HTN (hypertension)      /Renal   (+) Liver cyst      Neuro/Psych   (+) Anterior cerebral artery aneurysm     There were no vitals filed for this visit.    Past Surgical History:   Procedure Laterality Date    APPENDECTOMY       SECTION, CLASSIC      CT ANGIO NECK  2022    CT NECK ANGIO W AND WO IV CONTRAST 2022    CT HEAD ANGIO W AND WO IV CONTRAST  2022    CT HEAD ANGIO W AND WO IV CONTRAST 2022    IR ANGIOGRAM CEREBRAL BILATERAL Bilateral 10/09/2023    IR ANGIOGRAM CEREBRAL BILATERAL 10/9/2023 [facility] ANGIO    MR HEAD ANGIO W AND WO IV CONTRAST  2023    MR HEAD ANGIO W AND WO IV CONTRAST 2023 [facility]    MR HEAD ANGIO W AND WO IV CONTRAST  2023    MR HEAD ANGIO W AND WO IV CONTRAST 2023 [facility] MRI     Past Medical History:   Diagnosis Date    Anxiety     Cerebral aneurysm     Hypertension     PONV (postoperative nausea and vomiting)      No current facility-administered medications for this encounter.  Prior to Admission medications    Medication Sig Start Date End Date Taking? Authorizing Provider   albuterol 90 mcg/actuation inhaler Inhale 2 puffs every 4 hours if needed.    Historical Provider, MD block complex 0.4 mg tablet  Take 1 tablet by mouth once daily.    Historical Provider, MD   chlorhexidine (Hibiclens) 4 % external liquid Use as directed preoperatively 11/15/23   CHRIS Johansen   chlorhexidine (Peridex) 0.12 % solution Swish and spit. Do not swallow. Use as directed preoperatively 11/15/23   CHRIS Johansen   chlorthalidone (Hygroton) 25 mg tablet Take 1 tablet (25 mg) by mouth once daily.    Historical Provider, MD   cholecalciferol (Vitamin D-3) 25 MCG (1000 UT) capsule Take 1 capsule (25 mcg) by mouth once daily.    Historical Provider, MD   fluticasone (Flonase) 50 mcg/actuation nasal spray Administer 1 spray into affected nostril(s) once daily. 9/5/23   Historical Provider, MD   LORazepam (Ativan) 0.5 mg tablet Take 1 tablet (0.5 mg) by mouth every 8 hours if needed for anxiety.    Historical Provider, MD   NIFEdipine ER (NIFEdipine CC) 30 mg 24 hr tablet Take 1 tablet (30 mg) by mouth once daily.    Historical Provider, MD   potassium chloride CR 20 mEq ER tablet Take 1 tablet (20 mEq) by mouth once daily.    Historical Provider, MD     Allergies   Allergen Reactions    Azilsartan Shortness of breath, Other and Rash    Azilsartan Med-Chlorthalidone Other     Cant breathe    Furosemide Anaphylaxis, Shortness of breath, Other and Rash     Social History     Tobacco Use    Smoking status: Never    Smokeless tobacco: Never   Substance Use Topics    Alcohol use: Not Currently         Chemistry    Lab Results   Component Value Date/Time     11/15/2023 1448    K 3.6 11/15/2023 1448     11/15/2023 1448    CO2 29 11/15/2023 1448    BUN 20 11/15/2023 1448    CREATININE 0.92 11/15/2023 1448    Lab Results   Component Value Date/Time    CALCIUM 9.5 11/15/2023 1448          Lab Results   Component Value Date/Time    WBC 11.7 (H) 11/15/2023 1448    HGB 11.8 (L) 11/15/2023 1448    HCT 38.5 11/15/2023 1448     11/15/2023 1448     Lab Results   Component Value Date/Time    PROTIME 12.0  11/15/2023 1448    INR 1.1 11/15/2023 1448     No results found for this or any previous visit (from the past 4464 hour(s)).  No results found for this or any previous visit from the past 1095 days.   Clinical information reviewed:   Tobacco  Allergies  Meds   Med Hx  Surg Hx  OB Status  Fam Hx  Soc   Hx        NPO Detail:  No data recorded     Physical Exam    Airway  Mallampati: III  TM distance: >3 FB  Neck ROM: full     Cardiovascular   Rhythm: regular  Rate: normal     Dental    Pulmonary   Breath sounds clear to auscultation     Abdominal - normal exam             Anesthesia Plan    ASA 3     general     The patient is not a current smoker.  Patient was not previously instructed to abstain from smoking on day of procedure.  Patient did not smoke on day of procedure.    intravenous induction   Postoperative administration of opioids is intended.  Anesthetic plan and risks discussed with patient.  Use of blood products discussed with patient who.    Plan discussed with attending.

## 2023-12-06 NOTE — PROGRESS NOTES
Pharmacy Medication History Review    Mary Zapata is a 58 y.o. female admitted for Anterior cerebral artery aneurysm. Pharmacy reviewed the patient's waruj-pc-vlwmayfhi medications and allergies for accuracy.    The list below reflects the updated PTA list. Comments regarding how patient may be taking medications differently can be found in the Admit Orders Activity  Prior to Admission Medications   Prescriptions Last Dose Informant Patient Reported?   LORazepam (Ativan) 0.5 mg tablet Past Week Self Yes   Sig: Take 1 tablet (0.5 mg) by mouth every 8 hours if needed for anxiety.   NIFEdipine ER (NIFEdipine CC) 30 mg 24 hr tablet 12/6/2023 Self Yes   Sig: Take 1 tablet (30 mg) by mouth once daily.   albuterol 90 mcg/actuation inhaler Past Month Self Yes   Sig: Inhale 2 puffs every 4 hours if needed.   b complex 0.4 mg tablet Unknown Self Yes   Sig: Take 1 tablet by mouth once daily.   chlorhexidine (Hibiclens) 4 % external liquid 12/6/2023 Self No   Sig: Use as directed preoperatively   chlorhexidine (Peridex) 0.12 % solution 12/6/2023 Self No   Sig: Swish and spit. Do not swallow. Use as directed preoperatively   chlorthalidone (Hygroton) 25 mg tablet 12/5/2023 Self Yes   Sig: Take 1 tablet (25 mg) by mouth once daily.   cholecalciferol (Vitamin D-3) 25 MCG (1000 UT) capsule 11/29/2023 Self Yes   Sig: Take 1 capsule (25 mcg) by mouth once daily.   fluticasone (Flonase) 50 mcg/actuation nasal spray Past Month Self Yes   Sig: Administer 1 spray into affected nostril(s) once daily.   potassium chloride CR 20 mEq ER tablet patient taking 40 meq  (2 tablets ) daily Self Yes   Sig: Take 1 tablet (20 mEq) by mouth once daily.      Facility-Administered Medications: None        The list below reflects the updated allergy list. Please review each documented allergy for additional clarification and justification.  Allergies  Reviewed by Indira Barry RN on 12/6/2023        Severity Reactions Comments    Azilsartan  High Shortness of breath, Other, Rash     Azilsartan Med-chlorthalidone High Other Cant breathe    Furosemide High Anaphylaxis, Shortness of breath, Other, Rash             Patient declines M2B at discharge.     Sources used to complete the med history include Pharmacy Medication History Review    Mary Zapata is a 58 y.o. female admitted for Anterior cerebral artery aneurysm. Pharmacy reviewed the patient's dosht-zk-ihthpkaet medications and allergies for accuracy.    The list below reflects the updated PTA list. Comments regarding how patient may be taking medications differently can be found in the Admit Orders Activity  Prior to Admission Medications   Prescriptions Last Dose Informant Patient Reported?   LORazepam (Ativan) 0.5 mg tablet Past Week Self Yes   Sig: Take 1 tablet (0.5 mg) by mouth every 8 hours if needed for anxiety.   NIFEdipine ER (NIFEdipine CC) 30 mg 24 hr tablet 12/6/2023 Self Yes   Sig: Take 1 tablet (30 mg) by mouth once daily.   albuterol 90 mcg/actuation inhaler Past Month Self Yes   Sig: Inhale 2 puffs every 4 hours if needed.   b complex 0.4 mg tablet Unknown Self Yes   Sig: Take 1 tablet by mouth once daily.   chlorhexidine (Hibiclens) 4 % external liquid 12/6/2023 Self No   Sig: Use as directed preoperatively   chlorhexidine (Peridex) 0.12 % solution 12/6/2023 Self No   Sig: Swish and spit. Do not swallow. Use as directed preoperatively   chlorthalidone (Hygroton) 25 mg tablet 12/5/2023 Self Yes   Sig: Take 1 tablet (25 mg) by mouth once daily.   cholecalciferol (Vitamin D-3) 25 MCG (1000 UT) capsule 11/29/2023 Self Yes   Sig: Take 1 capsule (25 mcg) by mouth once daily.   fluticasone (Flonase) 50 mcg/actuation nasal spray Past Month Self Yes   Sig: Administer 1 spray into affected nostril(s) once daily.   potassium chloride CR 20 mEq ER tablet patient taking 40 meq  (2 tablets ) daily Self Yes   Sig: Take 1 tablet (20 mEq) by mouth once daily.      Facility-Administered  Medications: None        The list below reflects the updated allergy list. Please review each documented allergy for additional clarification and justification.  Allergies  Reviewed by Indira Barry RN on 12/6/2023        Severity Reactions Comments    Azilsartan High Shortness of breath, Other, Rash     Azilsartan Med-chlorthalidone High Other Cant breathe    Furosemide High Anaphylaxis, Shortness of breath, Other, Rash             Patient declines M2B at discharge.    Sources used to complete the med history include out patient fill history, OARRS, and patient interview along with 11/15/23 pat review.       Below are additional concerns with the patient's PTA list.      Jacob Kevin [facility]  Transitions of Care Clinical Pharmacist  Please reach out via [employer] for questions, if no response call  [employer] or [employer] [facility]       Below are additional concerns with the patient's PTA list.      Jacob Kevin [facility]  Transitions of Care Clinical Pharmacist  Please reach out via [employer] for questions, if no response call  [phone] or [employer] [facility]

## 2023-12-06 NOTE — ANESTHESIA PROCEDURE NOTES
Arterial Line:    Date/Time: 12/6/2023 8:30 AM    Staffing  Performed: resident   Authorized by: Arsalan Macario MD    Performed by: Bill De Santiago MD    An arterial line was placed. Procedure performed using surface landmarks.in the OR for the following indication(s): continuous blood pressure monitoring and blood sampling needed.    A 20 gauge (size), 1 and 3/4 inch (length), Angiocath (type) catheter was placed into the Right radial artery, secured by Tegaderm,   Seldinger technique used.  Events:  patient tolerated procedure well with no complications.

## 2023-12-06 NOTE — ANESTHESIA PROCEDURE NOTES
Peripheral IV  Date/Time: 12/6/2023 7:30 AM      Placement  Needle size: 18 G  Laterality: right  Location: hand  Local anesthetic: none  Site prep: chlorhexidine  Technique: anatomical landmarks  Attempts: 1

## 2023-12-06 NOTE — ANESTHESIA PROCEDURE NOTES
Peripheral IV  Date/Time: 12/6/2023 8:40 AM      Placement  Needle size: 18 G  Laterality: left  Location: hand  Local anesthetic: none  Site prep: chlorhexidine  Technique: anatomical landmarks  Attempts: 1

## 2023-12-06 NOTE — ANESTHESIA PROCEDURE NOTES
Peripheral IV  Date/Time: 12/6/2023 8:54 AM      Placement  Needle size: 16 G  Laterality: right  Location: forearm  Local anesthetic: none  Site prep: chlorhexidine  Technique: anatomical landmarks  Attempts: 1

## 2023-12-07 ENCOUNTER — APPOINTMENT (OUTPATIENT)
Dept: RADIOLOGY | Facility: HOSPITAL | Age: 58
DRG: 025 | End: 2023-12-07
Payer: COMMERCIAL

## 2023-12-07 LAB
ALBUMIN SERPL BCP-MCNC: 3.7 G/DL (ref 3.4–5)
ANION GAP SERPL CALC-SCNC: 15 MMOL/L (ref 10–20)
BUN SERPL-MCNC: 23 MG/DL (ref 6–23)
CALCIUM SERPL-MCNC: 8.6 MG/DL (ref 8.6–10.6)
CHLORIDE SERPL-SCNC: 104 MMOL/L (ref 98–107)
CO2 SERPL-SCNC: 26 MMOL/L (ref 21–32)
CREAT SERPL-MCNC: 0.82 MG/DL (ref 0.5–1.05)
ERYTHROCYTE [DISTWIDTH] IN BLOOD BY AUTOMATED COUNT: 14.3 % (ref 11.5–14.5)
GFR SERPL CREATININE-BSD FRML MDRD: 83 ML/MIN/1.73M*2
GLUCOSE SERPL-MCNC: 132 MG/DL (ref 74–99)
HCT VFR BLD AUTO: 32.4 % (ref 36–46)
HGB BLD-MCNC: 10.3 G/DL (ref 12–16)
MCH RBC QN AUTO: 25.5 PG (ref 26–34)
MCHC RBC AUTO-ENTMCNC: 31.8 G/DL (ref 32–36)
MCV RBC AUTO: 80 FL (ref 80–100)
NRBC BLD-RTO: 0 /100 WBCS (ref 0–0)
PHOSPHATE SERPL-MCNC: 5 MG/DL (ref 2.5–4.9)
PLATELET # BLD AUTO: 285 X10*3/UL (ref 150–450)
POTASSIUM SERPL-SCNC: 3.4 MMOL/L (ref 3.5–5.3)
RBC # BLD AUTO: 4.04 X10*6/UL (ref 4–5.2)
SODIUM SERPL-SCNC: 142 MMOL/L (ref 136–145)
WBC # BLD AUTO: 16.4 X10*3/UL (ref 4.4–11.3)

## 2023-12-07 PROCEDURE — 2500000004 HC RX 250 GENERAL PHARMACY W/ HCPCS (ALT 636 FOR OP/ED): Performed by: STUDENT IN AN ORGANIZED HEALTH CARE EDUCATION/TRAINING PROGRAM

## 2023-12-07 PROCEDURE — 2500000004 HC RX 250 GENERAL PHARMACY W/ HCPCS (ALT 636 FOR OP/ED): Performed by: NEUROLOGICAL SURGERY

## 2023-12-07 PROCEDURE — 2500000004 HC RX 250 GENERAL PHARMACY W/ HCPCS (ALT 636 FOR OP/ED)

## 2023-12-07 PROCEDURE — C1769 GUIDE WIRE: HCPCS

## 2023-12-07 PROCEDURE — 2780000003 HC OR 278 NO HCPCS

## 2023-12-07 PROCEDURE — 97161 PT EVAL LOW COMPLEX 20 MIN: CPT | Mod: GP | Performed by: PHYSICAL THERAPIST

## 2023-12-07 PROCEDURE — 2720000007 HC OR 272 NO HCPCS

## 2023-12-07 PROCEDURE — 2060000001 HC INTERMEDIATE ICU ROOM DAILY

## 2023-12-07 PROCEDURE — B31R1ZZ FLUOROSCOPY OF INTRACRANIAL ARTERIES USING LOW OSMOLAR CONTRAST: ICD-10-PCS | Performed by: NEUROLOGICAL SURGERY

## 2023-12-07 PROCEDURE — 2500000001 HC RX 250 WO HCPCS SELF ADMINISTERED DRUGS (ALT 637 FOR MEDICARE OP): Performed by: STUDENT IN AN ORGANIZED HEALTH CARE EDUCATION/TRAINING PROGRAM

## 2023-12-07 PROCEDURE — 36224 PLACE CATH CAROTD ART: CPT

## 2023-12-07 PROCEDURE — 80069 RENAL FUNCTION PANEL: CPT | Performed by: STUDENT IN AN ORGANIZED HEALTH CARE EDUCATION/TRAINING PROGRAM

## 2023-12-07 PROCEDURE — 36224 PLACE CATH CAROTD ART: CPT | Performed by: NEUROLOGICAL SURGERY

## 2023-12-07 PROCEDURE — 2500000005 HC RX 250 GENERAL PHARMACY W/O HCPCS: Performed by: STUDENT IN AN ORGANIZED HEALTH CARE EDUCATION/TRAINING PROGRAM

## 2023-12-07 PROCEDURE — 36415 COLL VENOUS BLD VENIPUNCTURE: CPT | Performed by: STUDENT IN AN ORGANIZED HEALTH CARE EDUCATION/TRAINING PROGRAM

## 2023-12-07 PROCEDURE — C1760 CLOSURE DEV, VASC: HCPCS

## 2023-12-07 PROCEDURE — 85027 COMPLETE CBC AUTOMATED: CPT | Performed by: STUDENT IN AN ORGANIZED HEALTH CARE EDUCATION/TRAINING PROGRAM

## 2023-12-07 RX ORDER — POTASSIUM CHLORIDE 14.9 MG/ML
20 INJECTION INTRAVENOUS ONCE
Status: COMPLETED | OUTPATIENT
Start: 2023-12-07 | End: 2023-12-07

## 2023-12-07 RX ORDER — ONDANSETRON 4 MG/1
4 TABLET, FILM COATED ORAL EVERY 6 HOURS PRN
Status: DISCONTINUED | OUTPATIENT
Start: 2023-12-07 | End: 2023-12-09 | Stop reason: HOSPADM

## 2023-12-07 RX ORDER — ONDANSETRON HYDROCHLORIDE 2 MG/ML
INJECTION, SOLUTION INTRAVENOUS
Status: COMPLETED | OUTPATIENT
Start: 2023-12-07 | End: 2023-12-07

## 2023-12-07 RX ORDER — MIDAZOLAM HYDROCHLORIDE 5 MG/ML
INJECTION, SOLUTION INTRAMUSCULAR; INTRAVENOUS
Status: COMPLETED | OUTPATIENT
Start: 2023-12-07 | End: 2023-12-07

## 2023-12-07 RX ORDER — ONDANSETRON HYDROCHLORIDE 2 MG/ML
4 INJECTION, SOLUTION INTRAVENOUS EVERY 8 HOURS PRN
Status: DISCONTINUED | OUTPATIENT
Start: 2023-12-07 | End: 2023-12-09 | Stop reason: HOSPADM

## 2023-12-07 RX ADMIN — ACETAMINOPHEN 650 MG: 325 TABLET ORAL at 14:45

## 2023-12-07 RX ADMIN — NIFEDIPINE 30 MG: 30 TABLET, FILM COATED, EXTENDED RELEASE ORAL at 08:24

## 2023-12-07 RX ADMIN — ONDANSETRON HYDROCHLORIDE 4 MG: 4 TABLET, FILM COATED ORAL at 11:57

## 2023-12-07 RX ADMIN — OXYCODONE HYDROCHLORIDE 5 MG: 5 TABLET ORAL at 20:32

## 2023-12-07 RX ADMIN — OXYCODONE HYDROCHLORIDE 2.5 MG: 5 TABLET ORAL at 12:02

## 2023-12-07 RX ADMIN — ACETAMINOPHEN 650 MG: 325 TABLET ORAL at 08:24

## 2023-12-07 RX ADMIN — OXYCODONE HYDROCHLORIDE 5 MG: 5 TABLET ORAL at 15:29

## 2023-12-07 RX ADMIN — POTASSIUM CHLORIDE 20 MEQ: 14.9 INJECTION, SOLUTION INTRAVENOUS at 06:18

## 2023-12-07 RX ADMIN — ACETAMINOPHEN 650 MG: 325 TABLET ORAL at 02:29

## 2023-12-07 RX ADMIN — ONDANSETRON 4 MG: 2 INJECTION INTRAMUSCULAR; INTRAVENOUS at 08:24

## 2023-12-07 RX ADMIN — ONDANSETRON 4 MG: 2 INJECTION, SOLUTION INTRAMUSCULAR; INTRAVENOUS at 09:50

## 2023-12-07 RX ADMIN — LEVETIRACETAM 500 MG: 500 TABLET, FILM COATED ORAL at 08:24

## 2023-12-07 RX ADMIN — ACETAMINOPHEN 650 MG: 325 TABLET ORAL at 20:36

## 2023-12-07 RX ADMIN — CHLORTHALIDONE 25 MG: 25 TABLET ORAL at 08:24

## 2023-12-07 RX ADMIN — ONDANSETRON HYDROCHLORIDE 4 MG: 4 TABLET, FILM COATED ORAL at 02:29

## 2023-12-07 RX ADMIN — OXYCODONE HYDROCHLORIDE 10 MG: 5 TABLET ORAL at 06:41

## 2023-12-07 RX ADMIN — OXYCODONE HYDROCHLORIDE 10 MG: 5 TABLET ORAL at 00:06

## 2023-12-07 RX ADMIN — SODIUM CHLORIDE 75 ML/HR: 9 INJECTION, SOLUTION INTRAVENOUS at 05:40

## 2023-12-07 RX ADMIN — LEVETIRACETAM 500 MG: 500 TABLET, FILM COATED ORAL at 20:36

## 2023-12-07 RX ADMIN — MIDAZOLAM HYDROCHLORIDE 1 MG: 5 INJECTION, SOLUTION INTRAMUSCULAR; INTRAVENOUS at 09:55

## 2023-12-07 RX ADMIN — PROMETHAZINE HYDROCHLORIDE 12.5 MG: 25 INJECTION INTRAMUSCULAR; INTRAVENOUS at 17:14

## 2023-12-07 ASSESSMENT — COGNITIVE AND FUNCTIONAL STATUS - GENERAL
MOVING TO AND FROM BED TO CHAIR: A LITTLE
TOILETING: A LITTLE
MOVING TO AND FROM BED TO CHAIR: A LITTLE
TURNING FROM BACK TO SIDE WHILE IN FLAT BAD: A LITTLE
WALKING IN HOSPITAL ROOM: A LITTLE
STANDING UP FROM CHAIR USING ARMS: A LITTLE
MOVING FROM LYING ON BACK TO SITTING ON SIDE OF FLAT BED WITH BEDRAILS: A LITTLE
CLIMB 3 TO 5 STEPS WITH RAILING: A LITTLE
WALKING IN HOSPITAL ROOM: A LITTLE
MOBILITY SCORE: 20
DAILY ACTIVITIY SCORE: 22
CLIMB 3 TO 5 STEPS WITH RAILING: A LOT
HELP NEEDED FOR BATHING: A LITTLE
MOBILITY SCORE: 18

## 2023-12-07 ASSESSMENT — PAIN DESCRIPTION - LOCATION: LOCATION: HEAD

## 2023-12-07 ASSESSMENT — PAIN SCALES - GENERAL
PAINLEVEL_OUTOF10: 10 - WORST POSSIBLE PAIN
PAINLEVEL_OUTOF10: 2
PAINLEVEL_OUTOF10: 5 - MODERATE PAIN
PAINLEVEL_OUTOF10: 10 - WORST POSSIBLE PAIN
PAINLEVEL_OUTOF10: 2
PAINLEVEL_OUTOF10: 6
PAINLEVEL_OUTOF10: 0 - NO PAIN
PAINLEVEL_OUTOF10: 2
PAINLEVEL_OUTOF10: 5 - MODERATE PAIN
PAINLEVEL_OUTOF10: 2
PAINLEVEL_OUTOF10: 2
PAINLEVEL_OUTOF10: 5 - MODERATE PAIN
PAINLEVEL_OUTOF10: 6

## 2023-12-07 ASSESSMENT — ACTIVITIES OF DAILY LIVING (ADL): LACK_OF_TRANSPORTATION: NO

## 2023-12-07 NOTE — PRE-PROCEDURE NOTE
Pre-Procedure H&P     Provider Assessment:  Diagnosis/Reason for Procedure: aneurysm clipping  Procedure: Diagnostic Cerebral Angiogram  Medications Reviewed:   yes   Prophylatic Antibiotics Needed:   no    Neuro status: A&Ox3, moving all extremities full strength   Mouth Opening OK: yes   Neck Flexibility OK: yes   Sedation Plan: moderate sedation   COVID-19 Risk Consent:  Surgeon has reviewed key risks related to the risk of chiki COVID-19 and if they contract COVID-19 what the risks are.

## 2023-12-07 NOTE — PROGRESS NOTES
Physical Therapy    Physical Therapy Evaluation    Patient Name: Mary Zapata  MRN: [id number]  Today's Date: 12/7/2023   Time Calculation  Start Time: 1336  Stop Time: 1414  Time Calculation (min): 38 min    Assessment/Plan   PT Assessment  PT Assessment Results: Decreased strength, Decreased endurance, Impaired balance, Decreased mobility, Pain, Decreased skin integrity  Rehab Prognosis: Excellent  Evaluation/Treatment Tolerance:  (limited by nausea/episodes of emesis)  Medical Staff Made Aware: Yes  Strengths: Ability to acquire knowledge, Premorbid level of function, Support of Caregivers, Coping skills, Attitude of self  Barriers to Participation:  (nausea/episodes of emesis)  End of Session Communication: Bedside nurse  Assessment Comment: 58 year old female s/p Left frontal craniotomy for clipping of Left GENA aneurysm presents today with incisional pain and nausea/two episode of emesis. Pt will likely do well at home with assist of family without PT needs, but will assess gait/stairs/equipment and homegoing PT needs again tomorrow when pt able to tolerate more mobility.  End of Session Patient Position: Up in chair, Alarm off, not on at start of session (per RN okay with no alarm)  IP OR SWING BED PT PLAN  Inpatient or Swing Bed: Inpatient  PT Plan  Treatment/Interventions: Bed mobility, Transfer training, Gait training, Stair training, Balance training, Neuromuscular re-education, Endurance training, Therapeutic exercise, Therapeutic activity, Home exercise program, Postural re-education  PT Plan: Skilled PT  PT Frequency: 5 times per week  PT Discharge Recommendations:  (TBD- likely no needs)  Equipment Recommended upon Discharge:  (TBD)  PT Recommended Transfer Status: Assist x1 (CGA with IV pole)  PT - OK to Discharge:  (will make final DC recs at next visit)      Subjective   General Visit Information:  General  Reason for Referral: Admit 12/6 for planned surgery; dx: anterior cerebral artery aneurysm;  "12/6 s/p Left frontal craniotomy for clipping Left GENA aneurysm  Past Medical History Relevant to Rehab: anxiety, Htn, cerebral aneurysm, SAH 12/2022 s/p Left GENA aneurysm coiling  Missed Visit: No  Missed Visit Reason:  (per RN Brian, pt off floor at angio)  Family/Caregiver Present: No  Prior to Session Communication: Bedside nurse  Patient Position Received: Bed, 3 rail up, Alarm off, not on at start of session  Preferred Learning Style: verbal  General Comment: Pt supine alert and very engaged, only limited by 2 episodes of emesis and incisional pain.  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)  Hand Dominance: Right  Prior Function Comments: drives, active  Precautions:  Precautions  Medical Precautions: Fall precautions, Seizure precautions  Post-Surgical Precautions:  (crani, tele)  Vital Signs:  Vital Signs  Heart Rate:  (sitting beginning of session: /76 HR 68  O2 97%; sitting chair at end of session:  /78  HR 80)    Objective   Pain:  Pain Assessment  Pain Assessment: 0-10  Pain Score:  (9/10 pre supine (incisional pain), less at end (pt didn't rate \"but it is less, hard to give it a number\"))  Pain Interventions: Repositioned, Ambulation/increased activity (RN gave pain meds)  Response to Interventions: pt with less pain as noted  Cognition:  Cognition  Overall Cognitive Status: Within Functional Limits  Orientation Level: Oriented X4    General Assessments:                Activity Tolerance  Endurance:  (mostly limited by emesis and fatigue (hasn't been " able to eat well yet; was eating at end of session))    Sensation  Light Touch: Not tested (reports no numbness/tingling)         Static Sitting Balance  Static Sitting-Balance Support: Feet supported, Bilateral upper extremity supported  Static Sitting-Level of Assistance: Close supervision    Static Standing Balance  Static Standing-Balance Support:  (unilateral support in IV pole)  Static Standing-Level of Assistance: Contact guard (CG/min (pt fatigued, nauseous))  Dynamic Standing Balance  Dynamic Standing-Balance Support:  (Right UE on IV pole)  Dynamic Standing-Balance:  (gait with turning)  Dynamic Standing-Comments: min A (CG/min (pt fatigued, nauseous))  Functional Assessments:  Bed Mobility  Bed Mobility: Yes  Bed Mobility 1  Bed Mobility 1: Rolling right  Level of Assistance 1: Contact guard, Minimal verbal cues  Bed Mobility Comments 1: using rail, HOB elevated 45 degrees  Bed Mobility 2  Bed Mobility  2: Side lying right to sit  Level of Assistance 2: Minimum assistance, Minimal verbal cues  Bed Mobility Comments 2: use of rail, HOB elevated 45 degrees    Transfers  Transfer: Yes  Transfer 1  Transfer From 1: Sit to, Stand to  Transfer to 1: Sit, Stand  Technique 1: Sit to stand, Stand to sit  Transfer Device 1:  (no device)  Transfer Level of Assistance 1: Minimum assistance, Minimal verbal cues  Transfers 2  Transfer From 2: Stand to  Transfer to 2: Chair with arms  Technique 2: Stand pivot  Transfer Device 2:  (no device)  Transfer Level of Assistance 2: Contact guard, Minimal verbal cues (CG/min A)    Ambulation/Gait Training  Ambulation/Gait Training Performed: Yes  Ambulation/Gait Training 1  Surface 1: Level tile  Device 1:  (Right UE on IV pole)  Assistance 1: Minimum assistance, Minimal verbal cues  Quality of Gait 1:  (little bit unsteady and slow (nauseous))  Comments/Distance (ft) 1: 10 (PT limited it due to pt having 2 episodes of emesis, very fatigued)    Stairs  Stairs:  No  Extremity/Trunk Assessments:  RUE   RUE : Within Functional Limits (ROM)  LUE   LUE: Within Functional Limits (ROM)  RLE   RLE : Within Functional Limits (ROM)  LLE   LLE : Within Functional Limits (ROM)  Outcome Measures:  [facility] Basic Mobility  Turning from your back to your side while in a flat bed without using bedrails: A little  Moving from lying on your back to sitting on the side of a flat bed without using bedrails: A little  Moving to and from bed to chair (including a wheelchair): A little  Standing up from a chair using your arms (e.g. wheelchair or bedside chair): A little  To walk in hospital room: A little  Climbing 3-5 steps with railing: A little  Basic Mobility - Total Score: 18    Encounter Problems       Encounter Problems (Active)       General Goals       supine to/from sit (HOB flat, no rail) independently (Progressing)       Start:  12/07/23    Expected End:  12/21/23            static sit EOB no UE supporrt >15 minutes independently (Progressing)       Start:  12/07/23    Expected End:  12/21/23               General Goals       independent with general HEP (Not Progressing)       Start:  12/07/23    Expected End:  12/21/23               Mobility       sit to/from stand, bed to/from chair no device independently (Progressing)       Start:  12/07/23    Expected End:  12/21/23            ambulate 250' no device modified independent, no LOB (Progressing)       Start:  12/07/23    Expected End:  12/21/23            up/down 12 steps with 1 rail and supervision, no LOB (Not Progressing)       Start:  12/07/23    Expected End:  12/21/23                   Education Documentation  Precautions, taught by Elke Vanegas PT at 12/7/2023  2:43 PM.  Learner: Patient  Readiness: Acceptance  Method: Explanation, Demonstration  Response: Verbalizes Understanding, Demonstrated Understanding  Comment: PT purpose/POC, post-crani precautions, safe mobility as able today    Mobility Training, taught by  Elke Vanegas, PT at 12/7/2023  2:43 PM.  Learner: Patient  Readiness: Acceptance  Method: Explanation, Demonstration  Response: Verbalizes Understanding, Demonstrated Understanding  Comment: PT purpose/POC, post-crani precautions, safe mobility as able today    Education Comments  No comments found.

## 2023-12-07 NOTE — CARE PLAN
The patient's goals for the shift include      The clinical goals for the shift include To remain hemodynamically stable.    O

## 2023-12-07 NOTE — PROCEDURES
Pre-Procedure Verification and Time Out:  Procedure Location: procedure area  HUDDLE - Pre-procedure Verification:  completed  TIME OUT - Final Verification:  completed immediately prior to procedure start  DEBRIEF: completed    Complications:  None; patient tolerated the procedure well.     Disposition: rPCU  Condition: stable  Specimens Collected: No specimens collected    General Information:   Anesthesia/ sedation: Non-Anesthesia  Indication(s)/Pre - Procedure Diagnoses: cerebral aneurysm  Post-Procedure Diagnosis: same  Procedure Name: Diagnostic Cerebral Angiogram  Procedure performed by: Dr. Elisha Golden  Assistant(s): Daniel  Estimated Blood Loss (mL): 15  Specimen: no  Informed Consent: consent obtained and in chart    Access: 5 Fr Sheath in R CFA  Closure: Mynx  Vessels Injected: LICA, R CFA  Moderate Sedation Time: no sedation given  Findings: No residual filling of left A2/3 junction aneurysm. Full report to follow in PACS dictation

## 2023-12-07 NOTE — PROGRESS NOTES
Physical Therapy                 Therapy Communication Note    Patient Name: Mary Zapata  MRN: [id number]  Today's Date: 12/7/2023     Discipline: Physical Therapy    Missed Visit Reason: Missed Visit Reason:  (per RN Brian, pt off floor at [facility])    Missed Time: Attempt    Comment: 10:35am

## 2023-12-07 NOTE — PROGRESS NOTES
Communication Note    Missed Visit: Yes  Missed Visit Reason: Patient in a medical procedure (Plan for Angio this AM. Will hold OT eval until appropriate)      12/07/23 at 8:22 AM   Bonnie Melgar OT   Rehab Office: [phone]

## 2023-12-07 NOTE — PROGRESS NOTES
"Mary Zapata is a 58 y.o. female on day 1 of admission presenting with Anterior cerebral artery aneurysm.    Subjective   No events overnight. Incision is clean, dry, intact       Objective     Physical Exam  NAD, A&Ox3  Cranial Nerves II-XII: PERRL, EOMI, Face symmetric, Facial SILT, Palate/Tongue midline and symmetric, shoulder shrugs symmetric, hearing intact to finger rubs bilaterally  Motor: 5/5, no pronator drift  Sensation: SILT throughout all extremities  DTRS: 2+ Throughout, No Hoffmans or Clonus  Incision is c/d/i    Last Recorded Vitals  Blood pressure 99/59, pulse 59, temperature 35.8 °C (96.4 °F), temperature source Temporal, resp. rate 20, height 1.626 m (5' 4.02\"), weight 98.9 kg (218 lb 0.6 oz), SpO2 96 %.  Intake/Output last 3 Shifts:  I/O last 3 completed shifts:  In: 3250 (32.9 mL/kg) [I.V.:2000 (20.2 mL/kg); IV Piggyback:1250]  Out: 1445 (14.6 mL/kg) [Urine:1105 (0.3 mL/kg/hr); Drains:90; Blood:250]  Weight: 98.9 kg     Relevant Results                This patient has a urinary catheter   Reason for the urinary catheter remaining today? Urine catheter unnecessary, will be removed today               Assessment/Plan   Principal Problem:    Anterior cerebral artery aneurysm  Active Problems:    Aneurysm of anterior cerebral artery    58 year old with h/o anxiety, HTN, aneurysmal SAH (12/2022) s/p coiling of of L A2-3 aneurysm, follow up imaging with growth of base remnant    12/6 s/p L crani for aneurysm clipping    Plan  REG  Maintain SGD  Angio this AM (NPO at midnight)   SBP<160   Keppra 500 BID (12/9)  PTOT          Letitia Baker MD      "

## 2023-12-08 LAB
ALBUMIN SERPL BCP-MCNC: 3.6 G/DL (ref 3.4–5)
ANION GAP SERPL CALC-SCNC: 13 MMOL/L (ref 10–20)
BLOOD EXPIRATION DATE: NORMAL
BUN SERPL-MCNC: 21 MG/DL (ref 6–23)
CALCIUM SERPL-MCNC: 8.3 MG/DL (ref 8.6–10.6)
CHLORIDE SERPL-SCNC: 101 MMOL/L (ref 98–107)
CO2 SERPL-SCNC: 30 MMOL/L (ref 21–32)
CREAT SERPL-MCNC: 0.79 MG/DL (ref 0.5–1.05)
DISPENSE STATUS: NORMAL
ERYTHROCYTE [DISTWIDTH] IN BLOOD BY AUTOMATED COUNT: 14.6 % (ref 11.5–14.5)
GFR SERPL CREATININE-BSD FRML MDRD: 86 ML/MIN/1.73M*2
GLUCOSE SERPL-MCNC: 140 MG/DL (ref 74–99)
HCT VFR BLD AUTO: 33.9 % (ref 36–46)
HGB BLD-MCNC: 10.4 G/DL (ref 12–16)
MCH RBC QN AUTO: 24.8 PG (ref 26–34)
MCHC RBC AUTO-ENTMCNC: 30.7 G/DL (ref 32–36)
MCV RBC AUTO: 81 FL (ref 80–100)
NRBC BLD-RTO: 0 /100 WBCS (ref 0–0)
PHOSPHATE SERPL-MCNC: 2.5 MG/DL (ref 2.5–4.9)
PLATELET # BLD AUTO: 263 X10*3/UL (ref 150–450)
POTASSIUM SERPL-SCNC: 3.2 MMOL/L (ref 3.5–5.3)
PRODUCT BLOOD TYPE: 6200
PRODUCT CODE: NORMAL
RBC # BLD AUTO: 4.19 X10*6/UL (ref 4–5.2)
SODIUM SERPL-SCNC: 141 MMOL/L (ref 136–145)
UNIT ABO: NORMAL
UNIT NUMBER: NORMAL
UNIT RH: NORMAL
UNIT VOLUME: 350
WBC # BLD AUTO: 13.4 X10*3/UL (ref 4.4–11.3)
XM INTEP: NORMAL

## 2023-12-08 PROCEDURE — 84100 ASSAY OF PHOSPHORUS: CPT

## 2023-12-08 PROCEDURE — 2500000004 HC RX 250 GENERAL PHARMACY W/ HCPCS (ALT 636 FOR OP/ED): Performed by: STUDENT IN AN ORGANIZED HEALTH CARE EDUCATION/TRAINING PROGRAM

## 2023-12-08 PROCEDURE — 97530 THERAPEUTIC ACTIVITIES: CPT | Mod: GP | Performed by: PHYSICAL THERAPIST

## 2023-12-08 PROCEDURE — 36415 COLL VENOUS BLD VENIPUNCTURE: CPT

## 2023-12-08 PROCEDURE — 85027 COMPLETE CBC AUTOMATED: CPT

## 2023-12-08 PROCEDURE — 97116 GAIT TRAINING THERAPY: CPT | Mod: GP | Performed by: PHYSICAL THERAPIST

## 2023-12-08 PROCEDURE — 1100000001 HC PRIVATE ROOM DAILY

## 2023-12-08 PROCEDURE — 96372 THER/PROPH/DIAG INJ SC/IM: CPT | Performed by: STUDENT IN AN ORGANIZED HEALTH CARE EDUCATION/TRAINING PROGRAM

## 2023-12-08 PROCEDURE — 2500000001 HC RX 250 WO HCPCS SELF ADMINISTERED DRUGS (ALT 637 FOR MEDICARE OP): Performed by: STUDENT IN AN ORGANIZED HEALTH CARE EDUCATION/TRAINING PROGRAM

## 2023-12-08 PROCEDURE — 2500000001 HC RX 250 WO HCPCS SELF ADMINISTERED DRUGS (ALT 637 FOR MEDICARE OP)

## 2023-12-08 PROCEDURE — 97165 OT EVAL LOW COMPLEX 30 MIN: CPT | Mod: GO

## 2023-12-08 RX ORDER — AMOXICILLIN 250 MG
1 CAPSULE ORAL DAILY
Qty: 7 TABLET | Refills: 0 | Status: SHIPPED | OUTPATIENT
Start: 2023-12-08 | End: 2023-12-15

## 2023-12-08 RX ORDER — POTASSIUM CHLORIDE 1.5 G/1.58G
40 POWDER, FOR SOLUTION ORAL ONCE
Status: COMPLETED | OUTPATIENT
Start: 2023-12-08 | End: 2023-12-08

## 2023-12-08 RX ORDER — HEPARIN SODIUM 5000 [USP'U]/ML
5000 INJECTION, SOLUTION INTRAVENOUS; SUBCUTANEOUS EVERY 8 HOURS
Status: DISCONTINUED | OUTPATIENT
Start: 2023-12-08 | End: 2023-12-09 | Stop reason: HOSPADM

## 2023-12-08 RX ORDER — ACETAMINOPHEN 325 MG/1
650 TABLET ORAL EVERY 6 HOURS PRN
Qty: 112 TABLET | Refills: 0 | Status: SHIPPED | OUTPATIENT
Start: 2023-12-08 | End: 2023-12-22

## 2023-12-08 RX ORDER — OXYCODONE HYDROCHLORIDE 5 MG/1
5 TABLET ORAL EVERY 4 HOURS PRN
Qty: 15 TABLET | Refills: 0 | Status: SHIPPED | OUTPATIENT
Start: 2023-12-08 | End: 2023-12-15

## 2023-12-08 RX ORDER — POTASSIUM CHLORIDE 1.5 G/1.58G
40 POWDER, FOR SOLUTION ORAL ONCE
Status: DISCONTINUED | OUTPATIENT
Start: 2023-12-08 | End: 2023-12-09 | Stop reason: HOSPADM

## 2023-12-08 RX ADMIN — HYDROMORPHONE HYDROCHLORIDE 0.2 MG: 1 INJECTION, SOLUTION INTRAMUSCULAR; INTRAVENOUS; SUBCUTANEOUS at 17:48

## 2023-12-08 RX ADMIN — OXYCODONE HYDROCHLORIDE 2.5 MG: 5 TABLET ORAL at 06:33

## 2023-12-08 RX ADMIN — ONDANSETRON 4 MG: 2 INJECTION INTRAMUSCULAR; INTRAVENOUS at 11:01

## 2023-12-08 RX ADMIN — POLYETHYLENE GLYCOL 3350 17 G: 17 POWDER, FOR SOLUTION ORAL at 09:00

## 2023-12-08 RX ADMIN — SODIUM CHLORIDE 75 ML/HR: 9 INJECTION, SOLUTION INTRAVENOUS at 10:05

## 2023-12-08 RX ADMIN — HEPARIN SODIUM 5000 UNITS: 5000 INJECTION INTRAVENOUS; SUBCUTANEOUS at 06:33

## 2023-12-08 RX ADMIN — ACETAMINOPHEN 650 MG: 325 TABLET ORAL at 09:04

## 2023-12-08 RX ADMIN — CHLORTHALIDONE 25 MG: 25 TABLET ORAL at 09:05

## 2023-12-08 RX ADMIN — LEVETIRACETAM 500 MG: 500 TABLET, FILM COATED ORAL at 20:51

## 2023-12-08 RX ADMIN — POTASSIUM CHLORIDE 40 MEQ: 1.5 POWDER, FOR SOLUTION ORAL at 17:41

## 2023-12-08 RX ADMIN — ACETAMINOPHEN 650 MG: 325 TABLET ORAL at 13:45

## 2023-12-08 RX ADMIN — HEPARIN SODIUM 5000 UNITS: 5000 INJECTION INTRAVENOUS; SUBCUTANEOUS at 20:51

## 2023-12-08 RX ADMIN — ACETAMINOPHEN 650 MG: 325 TABLET ORAL at 20:51

## 2023-12-08 RX ADMIN — OXYCODONE HYDROCHLORIDE 2.5 MG: 5 TABLET ORAL at 14:28

## 2023-12-08 RX ADMIN — HEPARIN SODIUM 5000 UNITS: 5000 INJECTION INTRAVENOUS; SUBCUTANEOUS at 12:04

## 2023-12-08 RX ADMIN — OXYCODONE HYDROCHLORIDE 5 MG: 5 TABLET ORAL at 02:22

## 2023-12-08 RX ADMIN — NIFEDIPINE 30 MG: 30 TABLET, FILM COATED, EXTENDED RELEASE ORAL at 09:05

## 2023-12-08 RX ADMIN — ACETAMINOPHEN 650 MG: 325 TABLET ORAL at 02:22

## 2023-12-08 RX ADMIN — LEVETIRACETAM 500 MG: 500 TABLET, FILM COATED ORAL at 09:05

## 2023-12-08 ASSESSMENT — PAIN DESCRIPTION - LOCATION
LOCATION: HEAD

## 2023-12-08 ASSESSMENT — COGNITIVE AND FUNCTIONAL STATUS - GENERAL
MOVING TO AND FROM BED TO CHAIR: A LITTLE
MOBILITY SCORE: 20
HELP NEEDED FOR BATHING: A LITTLE
DAILY ACTIVITIY SCORE: 23
STANDING UP FROM CHAIR USING ARMS: A LITTLE
WALKING IN HOSPITAL ROOM: A LITTLE
CLIMB 3 TO 5 STEPS WITH RAILING: A LITTLE

## 2023-12-08 ASSESSMENT — PAIN SCALES - GENERAL
PAINLEVEL_OUTOF10: 9
PAINLEVEL_OUTOF10: 0 - NO PAIN
PAINLEVEL_OUTOF10: 8
PAINLEVEL_OUTOF10: 3
PAINLEVEL_OUTOF10: 0 - NO PAIN
PAINLEVEL_OUTOF10: 5 - MODERATE PAIN

## 2023-12-08 ASSESSMENT — PAIN - FUNCTIONAL ASSESSMENT
PAIN_FUNCTIONAL_ASSESSMENT: 0-10

## 2023-12-08 ASSESSMENT — ACTIVITIES OF DAILY LIVING (ADL)
ADL_ASSISTANCE: INDEPENDENT
BATHING_ASSISTANCE: MODIFIED INDEPENDENT (DEVICE)

## 2023-12-08 NOTE — OP NOTE
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]     Procedures  left craniotomy for clipping of left GENA aneurysm  34959 - NC COMPLX INTRACRANIAL ARYSM CAROTID CIRCULATION      Surgeons      * Govind Ray - Primary    Resident/Fellow/Other Assistant:  Surgeon(s) and Role:     * Hellen Alonzo MD - Assisting     * Juan Milligan MD - Assisting    Procedure Summary  Anesthesia: General  ASA: III  Anesthesia Staff: Anesthesiologist: Dimitri Rhodes DO; Arsalan Macario MD  C-AA: ROMARIO Romero; ROMARIO Montiel  Anesthesia Resident: Bill De Santiago MD  Estimated Blood Loss: 250 mL  Intra-op Medications:   Medication Name Total Dose   lidocaine-epinephrine (Xylocaine W/EPI) 0.5 %-1:200,000 injection 20 mL   gelatin absorbable (Gelfoam) 100 sponge 1 each   thrombin (recombinant) (Recothrom) topical solution 10,000 Units   polymyxin B 1,000,000 Units in sodium chloride 0.9 % 1,000 mL irrigation 1,000 mL         Intraprocedure I/O Totals       None           Specimen: No specimens collected     Staff:   Circulator: Nancy Serra RN  Relief Circulator: Mey Andre RN  Scrub Person: Karen Ramirez RN; Mary Jane Delgado         Drains and/or Catheters:   Closed/Suction Drain Left Other (Comment) Bulb 10 Fr. (Active)   Site Description Healing 23 1600   Dressing Status Removed 23 1600   Drainage Appearance Serosanguineous 23 1600   Status Open to gravity drainage 23 1600   Sutures Removed Intact Yes 23 0800   Output (mL) 40 mL 23 1600       Closed/Suction Drain Right Scalp (Active)   Site Description Healing 23 0044   Dressing Status Removed 23       [REMOVED]  Urethral Catheter Non-latex 16 Fr. (Removed)   Site Assessment Clean;Skin intact 12/07/23 1047   Collection Container Standard drainage bag 12/07/23 0400   Securement Method Securing device (Describe) 12/07/23 0400   Reason for Continuing Urinary Catheterization accurate hourly measurement of urine volume in a critically ill patient that cannot be assessed by other volumes and urine collection strategies 12/06/23 2100   Output (mL) 325 mL 12/07/23 1200       Tourniquet Times:         Implants:  Implants       Type Name Action Serial No.      Graft GRAFT MATRIX, DURAL, DURAGEN PLUS 3X3 - [id number] Implanted      Neuro Interventional Implant CLIP, ANEURYSM, YASARGIL, MINI, CURVED, 4 MM, TITANIUM - [id number] Used, Not Implanted      Neuro Interventional Implant CLIP, ANEURYSM, YASARGIL, MINI, STRAIGHT, 5 MM, TITANIUM - [id number] Used, Not Implanted      Neuro Interventional Implant CLIP, ANEURYSM YASARGIL [id number] - [id number] Used, Not Implanted      Neuro Interventional Implant CLIP, ANEURYSM YASARGIL [id number] - [id number] Used, Not Implanted      Neuro Interventional Implant CLIP, ANEURYSM, YASARGIL, STANDARD, ANGLED, 5 MM, TITANIUM - [id number] Implanted      Neuro Interventional Implant CLIP, YASARGIL TI PERM MINI FEN 90DEG - [id number] Implanted      Neuro Interventional Implant CLIP, ANEURYSM, YASARGIL, MINI, STRAIGHT, 5 MM, TITANIUM - [id number] Implanted      Screw COVER, LW PROF NICKIE HOLE, 14MM W/ - [id number] Implanted      Screw MESH, 1.7 - 90 X 90 X 0.6 - [id number] Implanted      Neuro Interventional Implant CROSS PIN, AXS SELF-TAP, 1.5 X 4MM - [id number] Implanted      Screw SCREW, EMERG AXS 1.7 X 4MM - [id number] Implanted               Findings: See procedure details    Indications: Mary Zapata is an 58 y.o. 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         The patient was seen in the preoperative area. The risks, benefits, complications, treatment options, non-operative  alternatives, expected recovery and outcomes were discussed with the patient. The possibilities of reaction to medication, pulmonary aspiration, injury to surrounding structures, bleeding, recurrent infection, the need for additional procedures, failure to diagnose a condition, and creating a complication requiring transfusion or operation were discussed with the patient. The patient concurred with the proposed plan, giving informed consent.  The site of surgery was properly noted/marked if necessary per policy. The patient has been actively warmed in preoperative area. Preoperative antibiotics have been ordered and given within 1 hours of incision. Venous thrombosis prophylaxis have been ordered including bilateral sequential compression devices    Procedure Details:   After informed consent was obtained, the patient was brought back to the operating room and general anesthesia induced by the anesthesia team.  The patient was then put in supine position with the neck in slight extension and the head was fixed to the bed with Felix head fixation in this position.  The thin cut MRA and CT were transferred to the [employer] (MXD3D).  Point-to-point registration was done using the contours of the patient, forehead, and bridge of nose.  Multiple anatomic landmarks were used to check the accuracy of the registration.  A bicoronal incision was planned.  This area was clipped, prepped, and draped in the usual sterile fashion.  Incision was made with 10 blade.  Hemostasis was achieved with Flip clips.  The scalp was reflected forward.  Using navigation a left frontal parasagittal craniotomy was done staying above the frontal sinus.  Further bone was removed with acorn drill bit and Kerrison rongeurs over the superior sagittal sinus.  Hemostasis was achieved in the epidural space with Floseal and bipolar cautery.  The dura was cut in a C-shaped fashion and reflected medially with 4-0 Nurolon sutures.  Interhemispheric  subdural dissection was done with microscissors and bipolar cautery.  The operative microscope was then brought in and a subarachnoid dissection was done to the callosomarginal vessels.  This dissection was carried out further to the left pericallosal GENA aneurysm.  Sharp dissection was done to delineate and separate bilateral callosomarginal, pericallosal, and A2 segment of bilateral anterior cerebral arteries from the aneurysm dome and neck.  There was blebs both at the medial and lateral neck of the aneurysm remnant which had been previously coiled.  Complex clip reconstruction was performed with a fenestrated right angled clip, a mini right angle clip, and a straight mini clip.  Flow Doppler was performed before and after clipping which showed good flow and apparent vasculature.  Intraoperative ICG fluoroscopy was also done which showed occlusion of the aneurysm and good flow in the parent vasculature.  Hemostasis was achieved and the wound was copiously irrigated antibiotic saline.  The dura was reapproximated with interrupted 4-0 Nurolon suture.  A piece of DuraGen was placed over the space and the bone flap was replaced with titanium plate screws and a piece of mesh in the midline.  A 10 round subgaleal drain was placed and secured to skin with a 2-0 silk suture.  The galea was closed with interrupted inverted 2-0 Vicryl sutures.  The skin was closed with running 3-0 Prolene suture.  The wound was washed, dried, and dressed with bacitracin and Telfa dressing.  The patient was taken out of Isabella head fixation and turned over to the anesthesia team, extubated, and taken to the cover area in stable condition.  There were no neuromonitoring changes throughout the case.    Complications:  None; patient tolerated the procedure well.    Disposition: PACU - hemodynamically stable.  Condition: stable         Additional Details: Dr. Hellen Alonzo was assistant surgeon in this case because there was no  qualified senior level resident nor fellow available.     Attending Attestation: I was present and scrubbed for the entire procedure.    Govind Jeremy  Phone Number: [phone]

## 2023-12-08 NOTE — PROGRESS NOTES
Physical Therapy    Physical Therapy Treatment    Patient Name: Mary Zapata  MRN: [id number]  Today's Date: 12/8/2023  Time Calculation  Start Time: 1000  Stop Time: 1029  Time Calculation (min): 29 min       Assessment/Plan   PT Assessment  PT Assessment Results: Decreased strength, Decreased endurance, Impaired balance, Decreased mobility, Pain, Decreased skin integrity  Rehab Prognosis: Excellent  Evaluation/Treatment Tolerance: Patient tolerated treatment well  Medical Staff Made Aware: Yes (RN)  Strengths: Ability to acquire knowledge, Attitude of self, Coping skills, Support of Caregivers, Premorbid level of function  Barriers to Participation:  (none)  End of Session Communication: Bedside nurse  Assessment Comment: 58 year old female s/p Left frontal craniotomy for clipping of Left GENA aneurysm presents today with incisional pain and mild balance deficits (first day up post-op today). Recommend ambultion here with family/nursing/PT and DC home with assist of family without PT needs and no PT equipment needs.  End of Session Patient Position: Up in chair, Alarm off, not on at start of session (per RN Tyrel mills with no alarm)  PT Plan  Inpatient/Swing Bed or Outpatient: Inpatient  PT Plan  Treatment/Interventions: Bed mobility, Transfer training, Gait training, Stair training, Balance training, Neuromuscular re-education, Strengthening, Endurance training, Therapeutic activity, Home exercise program, Postural re-education  PT Plan: Skilled PT  PT Frequency: Daily  PT Discharge Recommendations: No PT needed after discharge  Equipment Recommended upon Discharge:  (NA)  PT Recommended Transfer Status: Assist x1 (CGA no device, balance deficits)  PT - OK to Discharge: Yes      General Visit Information:   PT  Visit  PT Received On: 12/08/23  General  Reason for Referral: Admit 12/6 for planned surgery; dx: anterior cerebral artery aneurysm; 12/6 s/p Left frontal craniotomy for clipping Left GENA aneurysm  Past  Medical History Relevant to Rehab: anxiety, Htn, cerebral aneurysm, SAH 12/2022 s/p Left GENA aneurysm coiling  Missed Visit: No  Family/Caregiver Present: Yes  Caregiver Feedback: son present at end of session and d/w him need to walk/guard pt when walking  Prior to Session Communication: Bedside nurse  Patient Position Received: Bed, 3 rail up, Alarm off, not on at start of session  Preferred Learning Style: verbal  General Comment: Pt supine alert and very engaged, little unsteady on feet but did well overall.    Subjective   Precautions:  Precautions  Medical Precautions: Fall precautions, Seizure precautions  Post-Surgical Precautions:  (crani, MARYANNE drain, SBP <160)  Vital Signs:  Vital Signs  Heart Rate:  (post gait sitting: /61 HR 65 O2 95%  (little lightheaded but wants to sit up, RN informed))    Objective   Pain:  Pain Assessment  Pain Assessment: 0-10  Pain Score: 0 - No pain  Pain Interventions: Repositioned, Ambulation/increased activity  Response to Interventions: no pain pre/during/post  Cognition:  Cognition  Overall Cognitive Status: Within Functional Limits  Orientation Level: Oriented X4  Postural Control:  Postural Control  Postural Control: Impaired  Righting Reactions: slightly decreased intermittently in dynamic standing as noted    Activity Tolerance:  Activity Tolerance  Endurance:  (little fatigued after longer walk)  Treatments:  Therapeutic Activity  Therapeutic Activity Performed: Yes  Therapeutic Activity 1: supine to stand with supervision no device, ambulated into bathroom with SBA to brush her teeth in standing use toilet, put robe one  Therapeutic Activity 2: transfer to chair at end of session with SB/CGA and cues for hand placement    Bed Mobility  Bed Mobility: Yes  Bed Mobility 1  Bed Mobility 1: Supine to sitting  Level of Assistance 1: Close supervision  Bed Mobility Comments 1: HOB elevated 25 degrees, use of rail, out on Right side  Bed Mobility 2  Bed Mobility  2: Side  lying right to sit  Level of Assistance 2: Minimum assistance, Minimal verbal cues  Bed Mobility Comments 2: use of rail, HOB elevated 45 degrees    Ambulation/Gait Training  Ambulation/Gait Training Performed: Yes  Ambulation/Gait Training 1  Surface 1: Level tile  Device 1: No device  Assistance 1: Contact guard, Minimal verbal cues (CG/min (2 slight LOB during gait requiring min A), pt reports feeling a little wobbly)  Quality of Gait 1:  (slightly increased lateral sway either direction (2 mild LOB during longer walk requiring min A), wide MEDARDO, decreased yanet)  Comments/Distance (ft) 1: 235  Transfers  Transfer: Yes  Transfer 1  Transfer From 1: Sit to, Stand to  Transfer to 1: Sit, Stand  Technique 1: Sit to stand, Stand to sit  Transfer Device 1:  (no device)  Transfer Level of Assistance 1: Contact guard  Transfers 2  Transfer From 2: Stand to  Transfer to 2: Chair with arms  Technique 2: Stand pivot  Transfer Device 2:  (no device)  Transfer Level of Assistance 2: Contact guard, Minimal verbal cues    Stairs  Stairs: Yes  Stairs  Comment/Number of Steps 1: up/down 4 step with Left rail on descent/Right on ascent, sometimes reciprocating with SB/CGA, no LOB    Outcome Measures:  [facility] Basic Mobility  Turning from your back to your side while in a flat bed without using bedrails: None  Moving from lying on your back to sitting on the side of a flat bed without using bedrails: None  Moving to and from bed to chair (including a wheelchair): A little  Standing up from a chair using your arms (e.g. wheelchair or bedside chair): A little  To walk in hospital room: A little  Climbing 3-5 steps with railing: A little  Basic Mobility - Total Score: 20    Education Documentation  Precautions, taught by Elke Vanegas, PT at 12/8/2023 11:12 AM.  Learner: Family, Patient  Readiness: Acceptance  Method: Explanation, Demonstration  Response: Demonstrated Understanding, Verbalizes Understanding  Comment: need for  assist with ambulation (slightly unsteady today), walk 5x/day here with family or nursing staff, fall risk    Mobility Training, taught by Elke Vanegas PT at 12/8/2023 11:12 AM.  Learner: Family, Patient  Readiness: Acceptance  Method: Explanation, Demonstration  Response: Demonstrated Understanding, Verbalizes Understanding  Comment: need for assist with ambulation (slightly unsteady today), walk 5x/day here with family or nursing staff, fall risk    Education Comments  No comments found.             Encounter Problems       Encounter Problems (Active)       General Goals       supine to/from sit (HOB flat, no rail) independently (Progressing)       Start:  12/07/23    Expected End:  12/21/23            static sit EOB no UE supporrt >15 minutes independently (Met)       Start:  12/07/23    Expected End:  12/21/23    Resolved:  12/08/23            General Goals       independent with general HEP (Not Progressing)       Start:  12/07/23    Expected End:  12/21/23               Mobility       sit to/from stand, bed to/from chair no device independently (Progressing)       Start:  12/07/23    Expected End:  12/21/23            ambulate 250' no device modified independent, no LOB (Progressing)       Start:  12/07/23    Expected End:  12/21/23            up/down 12 steps with 1 rail and supervision, no LOB (Progressing)       Start:  12/07/23    Expected End:  12/21/23

## 2023-12-08 NOTE — PROGRESS NOTES
"Mary Zapata is a 58 y.o. female on day 2 of admission presenting with Anterior cerebral artery aneurysm.    Subjective   No events overnight. Incision is clean, dry, intact       Objective     Physical Exam  NAD, A&Ox3  Cranial Nerves II-XII: PERRL, EOMI, Face symmetric, Facial SILT, Palate/Tongue midline and symmetric, shoulder shrugs symmetric, hearing intact to finger rubs bilaterally  Motor: 5/5, no pronator drift  Sensation: SILT throughout all extremities  DTRS: 2+ Throughout, No Hoffmans or Clonus  Incision is c/d/i    Last Recorded Vitals  Blood pressure 109/61, pulse 64, temperature 36.4 °C (97.5 °F), temperature source Temporal, resp. rate 16, height 1.626 m (5' 4.02\"), weight 98.9 kg (218 lb 0.6 oz), SpO2 95 %.  Intake/Output last 3 Shifts:  I/O last 3 completed shifts:  In: 4218.8 (42.7 mL/kg) [I.V.:2868.8 (29 mL/kg); IV Piggyback:1350]  Out: 2975 (30.1 mL/kg) [Urine:2390 (0.7 mL/kg/hr); Drains:335; Blood:250]  Weight: 98.9 kg     Relevant Results                This patient has a urinary catheter   Reason for the urinary catheter remaining today? Urine catheter unnecessary, will be removed today               Assessment/Plan   Principal Problem:    Anterior cerebral artery aneurysm  Active Problems:    Aneurysm of anterior cerebral artery    58 year old with h/o anxiety, HTN, aneurysmal SAH (12/2022) s/p coiling of of L A2-3 aneurysm, follow up imaging with growth of base remnant    12/6 s/p L crani for aneurysm clipping  12/7 DSA with no residual aneurysm     Plan  REG, likely downgrade this AM  Maintain SGD  SBP<160   Keppra 500 BID (12/9)  PTOT          Cassi Ham MD      "

## 2023-12-08 NOTE — PROGRESS NOTES
12/08/23        Transitional Care Coordination Progress Note:   Patient discussed during interdisciplinary rounds.   Team members present: RN TCC  MIGUEL RODGERS   Plan per Medical/Surgical team: HTN, aneurysmal SAH (12/2022) s/p coiling of of L A2-3 aneurysm, follow up imaging with growth of base remnant   Discharge disposition: Home   Status-Inpatient   Payer- Payor: [facility] / Plan: [facility] / Product Type: *No Product type* /    Potential Barriers: None   ADOD:12/9/2023  Andrew Stoll RN [facility] [phone]

## 2023-12-08 NOTE — PROGRESS NOTES
Occupational Therapy    Evaluation    Patient Name: Mary Zapata  MRN: [id number]  Today's Date: 12/8/2023  Time Calculation  Start Time: 1034  Stop Time: 1044  Time Calculation (min): 10 min    Assessment  IP OT Assessment  OT Assessment: No current skilled OT needs at this time  Barriers to Discharge: None  Evaluation/Treatment Tolerance: Patient tolerated treatment well  Medical Staff Made Aware: Yes  End of Session Communication: Bedside nurse  End of Session Patient Position: Up in chair, Alarm off, caregiver present  Plan:  No Skilled OT: No acute OT goals identified  OT Discharge Recommendations: No OT needed after discharge, No further acute OT  OT Recommended Transfer Status: Independent  OT - OK to Discharge: Yes    Subjective   Current Problem:  1. Aneurysm of anterior cerebral artery  Interoperative monitoring - IOM        General:  General  Reason for Referral: Admit 12/6 for planned surgery; dx: anterior cerebral artery aneurysm; 12/6 s/p Left frontal craniotomy for clipping Left GENA aneurysm  Past Medical History Relevant to Rehab: anxiety, Htn, cerebral aneurysm, SAH 12/2022 s/p Left GENA aneurysm coiling  Family/Caregiver Present: Yes  Caregiver Feedback: Son present, engaged in therapy eval  Prior to Session Communication: Bedside nurse  Patient Position Received: Up in chair, Alarm off, not on at start of session  Preferred Learning Style: verbal  General Comment: Pt pleasant and agreeable to therapy session, reports feeling better than yesterday.  Precautions:  Medical Precautions: Fall precautions, Seizure precautions  Vital Signs:     Pain:  Pain Assessment  Pain Assessment: 0-10  Pain Score: 0 - No pain    Objective   Cognition:  Overall Cognitive Status: Within Functional Limits  Orientation Level: Oriented X4    Home Living:  Type of Home: House  Lives With: Spouse  Home Adaptive Equipment: None  Home Layout: Two level  Home Access: Stairs to enter with rails  Entrance Stairs-Number of  Steps: 3  Bathroom Shower/Tub: Tub/shower unit  Bathroom Toilet: Standard  Bathroom Equipment: None   Prior Function:  Level of [place]: Independent with ADLs and functional transfers, Independent with homemaking with ambulation  ADL Assistance: Independent  Homemaking Assistance: Independent  Ambulatory Assistance: Independent  Vocational: Full time employment  Leisure: Shopping, walking  Hand Dominance: Right  Prior Function Comments: drives, active  IADL History:  Homemaking Responsibilities: Yes  Meal Prep Responsibility: Primary  Laundry Responsibility: Primary  Cleaning Responsibility: Primary  Bill Paying/Finance Responsibility: Primary  Shopping Responsibility: Primary  Current License: Yes  Mode of Transportation: Car  Occupation: Full time employment  Type of Occupation:   Leisure and Hobbies: Shopping, walking  ADL:  Eating Assistance: Independent (Anticipated)  Eating Deficit: Setup  Grooming Assistance: Modified independent (Device) (Anticipated)  Bathing Assistance: Modified independent (Device) (Anticipated)  Bathing Deficit: Supervision/safety  UE Dressing Assistance: Modified independent (Device) (Anticipated)  UE Dressing Deficit: Setup  LE Dressing Assistance: Stand by  LE Dressing Deficit: Supervision/safety (Donning/doffing footwear)  Toileting Assistance with Device: Stand by (Simulated)  Toileting Deficit: Supervison/safety  Activity Tolerance:  Endurance: Endurance does not limit participation in activity  Bed Mobility/Transfers: Bed Mobility  Bed Mobility: No    Transfers  Transfer: Yes  Transfer 1  Transfer From 1: Chair with arms to  Transfer to 1: Toilet  Technique 1:  (Ambulating)  Transfer Level of Assistance 1: Close supervision  Trials/Comments 1: SBA for safety, cueing to slow pace, similar assist upon return transfer    Sitting Balance:  Static Sitting Balance  Static Sitting-Balance Support: No upper extremity supported  Static Sitting-Level of Assistance:  Independent  Static Sitting-Comment/Number of Minutes: up in chair  Standing Balance:  Static Standing Balance  Static Standing-Balance Support: No upper extremity supported  Static Standing-Level of Assistance: Distant supervision  Static Standing-Comment/Number of Minutes: SUP for safety standing at chair  Dynamic Standing Balance  Dynamic Standing-Balance Support: No upper extremity supported  Dynamic Standing-Comments: SBA for safety    IADL's:   Homemaking Responsibilities: Yes  Meal Prep Responsibility: Primary  Laundry Responsibility: Primary  Cleaning Responsibility: Primary  Bill Paying/Finance Responsibility: Primary  Shopping Responsibility: Primary  Current License: Yes  Mode of Transportation: Car  Occupation: Full time employment  Type of Occupation:   Leisure and Hobbies: Shopping, walking  Vision: Vision - Basic Assessment  Current Vision: No visual deficits  Sensation:  Light Touch: No apparent deficits  Strength:  Strength Comments: WFL B UEs  Perception:  Inattention/Neglect: Appears intact  Coordination:  Movements are Fluid and Coordinated: Yes   Hand Function:  Hand Function  Gross Grasp: Functional  Coordination: Functional  Extremities: RUE   RUE : Within Functional Limits and LUE   LUE: Within Functional Limits    Outcome Measures: [facility] Daily Activity  Putting on and taking off regular lower body clothing: None  Bathing (including washing, rinsing, drying): A little  Putting on and taking off regular upper body clothing: None  Toileting, which includes using toilet, bedpan or urinal: None  Taking care of personal grooming such as brushing teeth: None  Eating Meals: None  Daily Activity - Total Score: 23     and OT Adult Other Outcome Measures  4AT: 4 AT -  Education Documentation  Body Mechanics, taught by Daniele Varghese OT at 12/8/2023 12:17 PM.  Learner: Family, Patient  Readiness: Acceptance  Method: Explanation  Response: Verbalizes Understanding    ADL Training, taught by  Daniele Varghese, OT at 12/8/2023 12:17 PM.  Learner: Family, Patient  Readiness: Acceptance  Method: Explanation  Response: Verbalizes Understanding    Education Comments  No comments found.       Daniele Varghese OTR/L

## 2023-12-08 NOTE — DOCUMENTATION CLARIFICATION NOTE
"    PATIENT:               ROHITH MAGANA  ACCT #:                  [id number]  MRN:                       [id number]  :                       1965  ADMIT DATE:       2023 5:53 AM  DISCH DATE:  RESPONDING PROVIDER #:        [id number]          PROVIDER RESPONSE TEXT:    Cerebral Edema    CDI QUERY TEXT:    [facility]_Cerebral Edema    Instruction:    Based on your assessment of the patient and the clinical information, please provide the requested documentation by clicking on the appropriate radio button and enter any additional information if prompted.    Question: Please further clarify if there is a diagnosis related to the clinical information    When answering this query, please exercise your independent professional judgment. The fact that a question is being asked, does not imply that any particular answer is desired or expected.    The patient's clinical indicators include:  Clinical Information:  Patient is a 58 year old female who presented for left GENA aneurysm clipping due to increased size of the base remnant from previous aneurysm clipping.    Clinical Indicators:  From the CT of the head on , \" Mild postsurgical edema in the frontal extracranial soft tissues \"    Treatment:  Dexamethasone, Mannitol, Keppra, frequent neuro checks, aneurysm clipping, angiography of GENA post aneurysm clipping    Risk Factors:  History of aneurysmal subarachnoid hemorrhage, increased size of the base remnant from previous aneurysm clipping, HTN  Options provided:  -- Cerebral Edema  -- Radiological findings are clinically insignificant  -- Other - I will add my own diagnosis  -- Refer to Clinical Documentation Reviewer    Query created by: Gladis Sierra on 2023 8:24 AM      Electronically signed by:  BRAYDON GARCIA MD 2023 11:55 AM          "

## 2023-12-09 VITALS
WEIGHT: 218.03 LBS | RESPIRATION RATE: 18 BRPM | HEIGHT: 64 IN | BODY MASS INDEX: 37.22 KG/M2 | OXYGEN SATURATION: 94 % | SYSTOLIC BLOOD PRESSURE: 124 MMHG | DIASTOLIC BLOOD PRESSURE: 77 MMHG | TEMPERATURE: 98.1 F | HEART RATE: 69 BPM

## 2023-12-09 PROCEDURE — 2500000004 HC RX 250 GENERAL PHARMACY W/ HCPCS (ALT 636 FOR OP/ED): Performed by: STUDENT IN AN ORGANIZED HEALTH CARE EDUCATION/TRAINING PROGRAM

## 2023-12-09 PROCEDURE — 2500000001 HC RX 250 WO HCPCS SELF ADMINISTERED DRUGS (ALT 637 FOR MEDICARE OP): Performed by: STUDENT IN AN ORGANIZED HEALTH CARE EDUCATION/TRAINING PROGRAM

## 2023-12-09 PROCEDURE — 96372 THER/PROPH/DIAG INJ SC/IM: CPT | Performed by: STUDENT IN AN ORGANIZED HEALTH CARE EDUCATION/TRAINING PROGRAM

## 2023-12-09 RX ADMIN — OXYCODONE HYDROCHLORIDE 10 MG: 5 TABLET ORAL at 08:06

## 2023-12-09 RX ADMIN — HEPARIN SODIUM 5000 UNITS: 5000 INJECTION INTRAVENOUS; SUBCUTANEOUS at 02:51

## 2023-12-09 RX ADMIN — NIFEDIPINE 30 MG: 30 TABLET, FILM COATED, EXTENDED RELEASE ORAL at 09:46

## 2023-12-09 RX ADMIN — OXYCODONE HYDROCHLORIDE 5 MG: 5 TABLET ORAL at 00:12

## 2023-12-09 RX ADMIN — ACETAMINOPHEN 650 MG: 325 TABLET ORAL at 02:50

## 2023-12-09 RX ADMIN — CHLORTHALIDONE 25 MG: 25 TABLET ORAL at 09:47

## 2023-12-09 RX ADMIN — POLYETHYLENE GLYCOL 3350 17 G: 17 POWDER, FOR SOLUTION ORAL at 09:46

## 2023-12-09 RX ADMIN — ACETAMINOPHEN 650 MG: 325 TABLET ORAL at 08:06

## 2023-12-09 RX ADMIN — LEVETIRACETAM 500 MG: 500 TABLET, FILM COATED ORAL at 09:46

## 2023-12-09 ASSESSMENT — PAIN - FUNCTIONAL ASSESSMENT
PAIN_FUNCTIONAL_ASSESSMENT: 0-10

## 2023-12-09 ASSESSMENT — PAIN SCALES - GENERAL
PAINLEVEL_OUTOF10: 4
PAINLEVEL_OUTOF10: 10 - WORST POSSIBLE PAIN
PAINLEVEL_OUTOF10: 5 - MODERATE PAIN
PAINLEVEL_OUTOF10: 0 - NO PAIN

## 2023-12-09 ASSESSMENT — PAIN DESCRIPTION - LOCATION
LOCATION: INCISION
LOCATION: HEAD
LOCATION: INCISION

## 2023-12-09 NOTE — DISCHARGE SUMMARY
Discharge Diagnosis  Anterior cerebral artery aneurysm    Issues Requiring Follow-Up  Post operative incision, aneurysm follow up    Test Results Pending At Discharge  Pending Labs       No current pending labs.            Hospital Course  Mary Zapata is a 58 y.o. female admitted 12/6 w/ pmx aneurysmal subarachnoid hemorrhage on 12/29/2022 status post left pericallosal GENA aneurysm coiling. She was recently found to have increased size of the base remnant on MRA and confirmed on angiography.  She presents for perioperative evaluation in anticipation of left craniotomy for clipping of left GENA aneurysm on 12/6/23 with Dr. Luna.     On 12/6/2023 patient underwent L craniotomy for coiling of L A2-3 aneurysm.  A post operative diagnostic angiogram was performed on 12/7/2023 and demonstrated no residual aneurysm.      Patient tolerated her post operative course well, without complication.  On day of discharge patient was in satisfactory condition with follow up appointments arranged.            Pertinent Physical Exam At Time of Discharge  Physical Exam  NAD, A&Ox3  Cranial Nerves II-XII: PERRL, EOMI, Face symmetric, Facial SILT, Palate/Tongue midline and symmetric, shoulder shrugs symmetric  Motor: 5/5, no dysmetria on finger to nose, no pronator drift  Sensation: SILT throughout all extremities  DTRS: 2+ Throughout, No Hoffmans or Clonus   Home Medications     Medication List      ASK your doctor about these medications     albuterol 90 mcg/actuation inhaler   b complex 0.4 mg tablet   * chlorhexidine 0.12 % solution; Commonly known as: Peridex; Swish and   spit. Do not swallow. Use as directed preoperatively   * chlorhexidine 4 % external liquid; Commonly known as: Hibiclens; Use   as directed preoperatively   chlorthalidone 25 mg tablet; Commonly known as: Hygroton   cholecalciferol 25 MCG (1000 UT) capsule; Commonly known as: Vitamin D-3   fluticasone 50 mcg/actuation nasal spray; Commonly known as: Flonase    LORazepam 0.5 mg tablet; Commonly known as: Ativan   NIFEdipine ER 30 mg 24 hr tablet; Commonly known as: Adalat CC   potassium chloride CR 20 mEq ER tablet; Commonly known as: Klor-Con M20  * This list has 2 medication(s) that are the same as other medications   prescribed for you. Read the directions carefully, and ask your doctor or   other care provider to review them with you.       Outpatient Follow-Up  Future Appointments   Date Time Provider Department Center   12/19/2023 11:00 AM CHRIS Tran [id number] [facility]   1/16/2024  2:30 PM CHRIS Tran [id number] [facility]       Cassi Ham MD

## 2023-12-09 NOTE — PROGRESS NOTES
"Mary Zapata is a 58 y.o. female on day 3 of admission presenting with Anterior cerebral artery aneurysm.    Subjective   No events overnight. Patient with expected post operative pain that improves with medication.  Patient is comfortable with discharge at this time     Objective     Physical Exam  NAD, A&Ox3  Cranial Nerves II-XII: PERRL, EOMI, Face symmetric, Facial SILT, Palate/Tongue midline and symmetric, shoulder shrugs symmetric, hearing intact to finger rubs bilaterally  Motor: 5/5, no pronator drift  Sensation: SILT throughout all extremities  DTRS: 2+ Throughout, No Hoffmans or Clonus  Incision is c/d/i    Last Recorded Vitals  Blood pressure 109/67, pulse 65, temperature 36.1 °C (97 °F), temperature source Temporal, resp. rate 18, height 1.626 m (5' 4.02\"), weight 98.9 kg (218 lb 0.6 oz), SpO2 94 %.  Intake/Output last 3 Shifts:  I/O last 3 completed shifts:  In: 750 (7.6 mL/kg) [I.V.:750 (7.6 mL/kg)]  Out: 915 (9.3 mL/kg) [Urine:570 (0.2 mL/kg/hr); Drains:345]  Weight: 98.9 kg     Relevant Results                This patient has a urinary catheter   Reason for the urinary catheter remaining today? Urine catheter unnecessary, will be removed today               Assessment/Plan   Principal Problem:    Anterior cerebral artery aneurysm  Active Problems:    Aneurysm of anterior cerebral artery    58 year old with h/o anxiety, HTN, aneurysmal SAH (12/2022) s/p coiling of of L A2-3 aneurysm, follow up imaging with growth of base remnant    12/6 s/p L crani for aneurysm clipping  12/7 DSA with no residual aneurysm   12/9 SGD dc'd    Plan  [facility]  [facility] -[facility]    Discharge this AM         Cassi Ham MD      "

## 2023-12-19 ENCOUNTER — OFFICE VISIT (OUTPATIENT)
Dept: NEUROSURGERY | Facility: CLINIC | Age: 58
End: 2023-12-19
Payer: COMMERCIAL

## 2023-12-19 VITALS
WEIGHT: 218 LBS | DIASTOLIC BLOOD PRESSURE: 81 MMHG | HEIGHT: 64 IN | HEART RATE: 77 BPM | SYSTOLIC BLOOD PRESSURE: 156 MMHG | TEMPERATURE: 98.4 F | BODY MASS INDEX: 37.22 KG/M2

## 2023-12-19 DIAGNOSIS — I67.1: ICD-10-CM

## 2023-12-19 DIAGNOSIS — Z86.79 HISTORY OF NONTRAUMATIC RUPTURE OF CEREBRAL ANEURYSM: Primary | ICD-10-CM

## 2023-12-19 DIAGNOSIS — Z98.890 S/P CRANIOTOMY: ICD-10-CM

## 2023-12-19 PROCEDURE — 3079F DIAST BP 80-89 MM HG: CPT | Performed by: NURSE PRACTITIONER

## 2023-12-19 PROCEDURE — 99212 OFFICE O/P EST SF 10 MIN: CPT | Performed by: NURSE PRACTITIONER

## 2023-12-19 PROCEDURE — 3077F SYST BP >= 140 MM HG: CPT | Performed by: NURSE PRACTITIONER

## 2023-12-19 PROCEDURE — 1036F TOBACCO NON-USER: CPT | Performed by: NURSE PRACTITIONER

## 2023-12-19 PROCEDURE — 3008F BODY MASS INDEX DOCD: CPT | Performed by: NURSE PRACTITIONER

## 2023-12-19 ASSESSMENT — PAIN SCALES - GENERAL: PAINLEVEL: 10-WORST PAIN EVER

## 2023-12-19 NOTE — PROGRESS NOTES
Chief Complaint:   Mary Zapata is a 58 y.o. year old woman here for 2 week post op visit, S/P craniotomy for aneurysm resection.    Objective   Vitals:    12/19/23 1048   BP: 156/81   Pulse: 77   Temp: 36.9 °C (98.4 °F)     Exam:  no acute distress, well developed woman appearing her  stated age  normal sclera  moist mucus membranes  alert and oriented, pupils equal and round, extraocular movements intact, full strength in all extremities, normal sensation to light touch throughout, gait WNL.  Anterior craniotomy surgical wound is dry, healing , no signs of infection with Prolene sutures intact  normal mood      Assessment/Plan   The primary encounter diagnosis was History of nontraumatic rupture of cerebral aneurysm. Diagnoses of Anterior cerebral artery aneurysm and S/P craniotomy were also pertinent to this visit.  Here for 2 week POV and removal of sutures. The wound is dry and healing, no sign of infection. Sutures removed without difficulty.   She is doing well, active around her home, no pain, no complaints. Only using Tylenol occasionally for incisional discomfort. Wound care discussed.   Neuro exam intact.  She has appointment for 6 week POV.    Jacki Petit, APRN-CNP      This note was created in part after personal review of documents in EMR including recent labs and available radiologic imaging. Total time spent in review of EMR, relevant imaging, time with patient and completion of this document is 15 minutes.

## 2023-12-19 NOTE — LETTER
December 19, 2023     Patient: Mary Zapata   YOB: 1965   Date of Visit: 12/19/2023       To Whom It May Concern:    It is my medical opinion that Mary Zapata may return to light duty immediately with the following restrictions: no heavy lifting greater than 20 pounds on 1/22/2024 .    If you have any questions or concerns, please don't hesitate to call.         Sincerely,        Jacki Petit, APRN-CNP    CC: No Recipients

## 2024-01-16 ENCOUNTER — OFFICE VISIT (OUTPATIENT)
Dept: NEUROSURGERY | Facility: CLINIC | Age: 59
End: 2024-01-16
Payer: COMMERCIAL

## 2024-01-16 ENCOUNTER — APPOINTMENT (OUTPATIENT)
Dept: NEUROSURGERY | Facility: CLINIC | Age: 59
End: 2024-01-16
Payer: COMMERCIAL

## 2024-01-16 VITALS
RESPIRATION RATE: 20 BRPM | DIASTOLIC BLOOD PRESSURE: 78 MMHG | TEMPERATURE: 97.5 F | HEART RATE: 74 BPM | SYSTOLIC BLOOD PRESSURE: 137 MMHG | OXYGEN SATURATION: 98 %

## 2024-01-16 DIAGNOSIS — I67.1 CEREBRAL ANEURYSM (HHS-HCC): Primary | ICD-10-CM

## 2024-01-16 DIAGNOSIS — Z98.890 STATUS POST CRANIOTOMY: ICD-10-CM

## 2024-01-16 PROCEDURE — 3078F DIAST BP <80 MM HG: CPT | Performed by: NURSE PRACTITIONER

## 2024-01-16 PROCEDURE — 1036F TOBACCO NON-USER: CPT | Performed by: NURSE PRACTITIONER

## 2024-01-16 PROCEDURE — 3075F SYST BP GE 130 - 139MM HG: CPT | Performed by: NURSE PRACTITIONER

## 2024-01-16 PROCEDURE — 99213 OFFICE O/P EST LOW 20 MIN: CPT | Performed by: NURSE PRACTITIONER

## 2024-01-17 PROBLEM — Z98.890 STATUS POST CRANIOTOMY: Status: ACTIVE | Noted: 2024-01-17

## 2024-01-17 NOTE — PROGRESS NOTES
Chief Complaint:   Mary Zapata is a 58 y.o. year old woman here for 6 week post op visit S/P craniotomy for aneurysm resection.         Objective   Vitals:    01/16/24 1428   BP: 137/78   Pulse: 74   Resp: 20   Temp: 36.4 °C (97.5 °F)   SpO2: 98%     Exam  no acute distress, well developed woman appearing her  stated age  normal sclera  moist mucus membranes  no peripheral edema   symmetric chest rise  nondistended abdomen  alert and oriented, pupils equal and round, extraocular movements intact, full strength in all extremities, normal sensation to light touch throughout, normal symmetric reflexes, no dysmetria  normal mood  Bifrontal cranial surgical wound is dry and healing, no redness or S/S of infection      Assessment/Plan   The primary encounter diagnosis was Cerebral aneurysm. A diagnosis of Status post craniotomy was also pertinent to this visit.  Mary Zapata is 6 week S/P craniotomy for left GENA aneurysm clipping, s/p coiling of aneurysm.  She is doing well she is ambulatory completing daily activities and verbalizing desire to get back to work.  She denies any neurologic symptoms, no headaches, no visual changes, no weakness.  Her surgical wound bifrontal craniotomy is healing well without signs or symptoms of infection.  Postoperative MRI brain demonstrated complete aneurysm exclusion.  She may return to work as desired.  She can return as needed for follow-up, and we will plan for repeat angiogram in 1 year with Dr. Luna.      Jacki Petit, APRN-CNP      This note was created in part after personal review of documents in EMR including recent labs and available radiologic imaging. Total time spent in review of EMR, relevant imaging, time with patient and completion of this document is 30 minutes.            High Dose Vitamin A Pregnancy And Lactation Text: High dose vitamin A therapy is contraindicated during pregnancy and breast feeding. Azithromycin Counseling:  I discussed with the patient the risks of azithromycin including but not limited to GI upset, allergic reaction, drug rash, diarrhea, and yeast infections. Bactrim Counseling:  I discussed with the patient the risks of sulfa antibiotics including but not limited to GI upset, allergic reaction, drug rash, diarrhea, dizziness, photosensitivity, and yeast infections.  Rarely, more serious reactions can occur including but not limited to aplastic anemia, agranulocytosis, methemoglobinemia, blood dyscrasias, liver or kidney failure, lung infiltrates or desquamative/blistering drug rashes. Minocycline Counseling: Patient advised regarding possible photosensitivity and discoloration of the teeth, skin, lips, tongue and gums.  Patient instructed to avoid sunlight, if possible.  When exposed to sunlight, patients should wear protective clothing, sunglasses, and sunscreen.  The patient was instructed to call the office immediately if the following severe adverse effects occur:  hearing changes, easy bruising/bleeding, severe headache, or vision changes.  The patient verbalized understanding of the proper use and possible adverse effects of minocycline.  All of the patient's questions and concerns were addressed. Topical Retinoid Pregnancy And Lactation Text: This medication is Pregnancy Category C. It is unknown if this medication is excreted in breast milk. Topical Clindamycin Counseling: Patient counseled that this medication may cause skin irritation or allergic reactions.  In the event of skin irritation, the patient was advised to reduce the amount of the drug applied or use it less frequently.   The patient verbalized understanding of the proper use and possible adverse effects of clindamycin.  All of the patient's questions and concerns were addressed. Topical Clindamycin Pregnancy And Lactation Text: This medication is Pregnancy Category B and is considered safe during pregnancy. It is unknown if it is excreted in breast milk. Erythromycin Pregnancy And Lactation Text: This medication is Pregnancy Category B and is considered safe during pregnancy. It is also excreted in breast milk. Topical Sulfur Applications Pregnancy And Lactation Text: This medication is Pregnancy Category C and has an unknown safety profile during pregnancy. It is unknown if this topical medication is excreted in breast milk. Use Enhanced Medication Counseling?: No Doxycycline Pregnancy And Lactation Text: This medication is Pregnancy Category D and not consider safe during pregnancy. It is also excreted in breast milk but is considered safe for shorter treatment courses. Azithromycin Pregnancy And Lactation Text: This medication is considered safe during pregnancy and is also secreted in breast milk. Tetracycline Pregnancy And Lactation Text: This medication is Pregnancy Category D and not consider safe during pregnancy. It is also excreted in breast milk. Isotretinoin Counseling: Patient should get monthly blood tests, not donate blood, not drive at night if vision affected, not share medication, and not undergo elective surgery for 6 months after tx completed. Side effects reviewed, pt to contact office should one occur. Benzoyl Peroxide Pregnancy And Lactation Text: This medication is Pregnancy Category C. It is unknown if benzoyl peroxide is excreted in breast milk. Birth Control Pills Pregnancy And Lactation Text: This medication should be avoided if pregnant and for the first 30 days post-partum. Detail Level: Zone Benzoyl Peroxide Counseling: Patient counseled that medicine may cause skin irritation and bleach clothing.  In the event of skin irritation, the patient was advised to reduce the amount of the drug applied or use it less frequently.   The patient verbalized understanding of the proper use and possible adverse effects of benzoyl peroxide.  All of the patient's questions and concerns were addressed. Birth Control Pills Counseling: Birth Control Pill Counseling: I discussed with the patient the potential side effects of OCPs including but not limited to increased risk of stroke, heart attack, thrombophlebitis, deep venous thrombosis, hepatic adenomas, breast changes, GI upset, headaches, and depression.  The patient verbalized understanding of the proper use and possible adverse effects of OCPs. All of the patient's questions and concerns were addressed. Tazorac Pregnancy And Lactation Text: This medication is not safe during pregnancy. It is unknown if this medication is excreted in breast milk. Tetracycline Counseling: Patient counseled regarding possible photosensitivity and increased risk for sunburn.  Patient instructed to avoid sunlight, if possible.  When exposed to sunlight, patients should wear protective clothing, sunglasses, and sunscreen.  The patient was instructed to call the office immediately if the following severe adverse effects occur:  hearing changes, easy bruising/bleeding, severe headache, or vision changes.  The patient verbalized understanding of the proper use and possible adverse effects of tetracycline.  All of the patient's questions and concerns were addressed. Patient understands to avoid pregnancy while on therapy due to potential birth defects. Dapsone Pregnancy And Lactation Text: This medication is Pregnancy Category C and is not considered safe during pregnancy or breast feeding. Doxycycline Counseling:  Patient counseled regarding possible photosensitivity and increased risk for sunburn.  Patient instructed to avoid sunlight, if possible.  When exposed to sunlight, patients should wear protective clothing, sunglasses, and sunscreen.  The patient was instructed to call the office immediately if the following severe adverse effects occur:  hearing changes, easy bruising/bleeding, severe headache, or vision changes.  The patient verbalized understanding of the proper use and possible adverse effects of doxycycline.  All of the patient's questions and concerns were addressed. Spironolactone Counseling: Patient advised regarding risks of diarrhea, abdominal pain, hyperkalemia, birth defects (for female patients), liver toxicity and renal toxicity. The patient may need blood work to monitor liver and kidney function and potassium levels while on therapy. The patient verbalized understanding of the proper use and possible adverse effects of spironolactone.  All of the patient's questions and concerns were addressed. Tazorac Counseling:  Patient advised that medication is irritating and drying.  Patient may need to apply sparingly and wash off after an hour before eventually leaving it on overnight.  The patient verbalized understanding of the proper use and possible adverse effects of tazorac.  All of the patient's questions and concerns were addressed. Bactrim Pregnancy And Lactation Text: This medication is Pregnancy Category D and is known to cause fetal risk.  It is also excreted in breast milk. Erythromycin Counseling:  I discussed with the patient the risks of erythromycin including but not limited to GI upset, allergic reaction, drug rash, diarrhea, increase in liver enzymes, and yeast infections. High Dose Vitamin A Counseling: Side effects reviewed, pt to contact office should one occur. Isotretinoin Pregnancy And Lactation Text: This medication is Pregnancy Category X and is considered extremely dangerous during pregnancy. It is unknown if it is excreted in breast milk. Topical Retinoid counseling:  Patient advised to apply a pea-sized amount only at bedtime and wait 30 minutes after washing their face before applying.  If too drying, patient may add a non-comedogenic moisturizer. The patient verbalized understanding of the proper use and possible adverse effects of retinoids.  All of the patient's questions and concerns were addressed. Spironolactone Pregnancy And Lactation Text: This medication can cause feminization of the male fetus and should be avoided during pregnancy. The active metabolite is also found in breast milk. Topical Sulfur Applications Counseling: Topical Sulfur Counseling: Patient counseled that this medication may cause skin irritation or allergic reactions.  In the event of skin irritation, the patient was advised to reduce the amount of the drug applied or use it less frequently.   The patient verbalized understanding of the proper use and possible adverse effects of topical sulfur application.  All of the patient's questions and concerns were addressed. Dapsone Counseling: I discussed with the patient the risks of dapsone including but not limited to hemolytic anemia, agranulocytosis, rashes, methemoglobinemia, kidney failure, peripheral neuropathy, headaches, GI upset, and liver toxicity.  Patients who start dapsone require monitoring including baseline LFTs and weekly CBCs for the first month, then every month thereafter.  The patient verbalized understanding of the proper use and possible adverse effects of dapsone.  All of the patient's questions and concerns were addressed.

## 2024-06-26 ENCOUNTER — APPOINTMENT (OUTPATIENT)
Dept: GENETICS | Facility: CLINIC | Age: 59
End: 2024-06-26
Payer: COMMERCIAL

## 2024-07-01 ENCOUNTER — PATIENT MESSAGE (OUTPATIENT)
Dept: GENETICS | Facility: CLINIC | Age: 59
End: 2024-07-01
Payer: COMMERCIAL

## 2024-10-16 ENCOUNTER — APPOINTMENT (OUTPATIENT)
Dept: GENETICS | Facility: CLINIC | Age: 59
End: 2024-10-16
Payer: COMMERCIAL

## 2024-11-12 ENCOUNTER — APPOINTMENT (OUTPATIENT)
Dept: GENETICS | Facility: CLINIC | Age: 59
End: 2024-11-12
Payer: COMMERCIAL

## 2024-11-12 DIAGNOSIS — Z80.3 FAMILY HISTORY OF BREAST CANCER: ICD-10-CM

## 2024-11-12 NOTE — PROGRESS NOTES
Subjective   Patient ID: Mary Zapata is a 58 y.o. female who presents for cancer genetic counseling due to a family history of breast cancer, and a reported CHEK2 mutation in her sister. We reviewed the hereditary aspects of cancer, and the genetic testing options available to Mary.    CANCER MEDICAL HISTORY   Personal history of cancer?   -no     PREVIOUS GENETIC TESTING?   -reportedly negative ADPKD genetic testing     CANCER SCREENING HISTORY   Mammograms/MRIS?   -11/15: reportedly WNL   -annually, reportedly all WNL     Breast biopsies?   -2019: left breast, WNL     Ovarian cancer screening (ultrasound or CA-125)?   -no     PAP smear?   -all WNL     Colonoscopy?   -: all WNL     Endoscopy?   -many years ago, WNL     Hysterectomy?   -no     Oophorectomy?   -no     Dermatology?   -regularly WNL     Other cancer screening?   --current: multiple bilateral renal cysts and liver cysts, liver cysts resected , suspicious for ADPKD     Radiation?   -no     OTHER MEDICAL CONCERNS   -liver and kidney cysts   -HTN   -HLD     REPRODUCTIVE HISTORY   # children: 2   # pregnancies: 3   Age at first birth: 29   Breastfeedin-8 months   Menarche: 9   Menopause: late 40s-early 50s   OCP: 10 years   HRT: no     SOCIAL HISTORY   Smoking: never   Alcohol use: 2 drinks/week   Career: , former dental hygienist     FAMILY HISTORY:   A 4-generation pedigree was obtained. The family history was significant for the following:     -Sister diagnosed with breast cancer at age 55 and reportedly has a CHEK2 mutation, now 56.   -Niece (above sister’s daughter) reportedly has a CHEK2 mutation, and no history of cancer.   -Paternal cousin diagnosed with breast cancer in her 40s,  shortly after.   -Paternal second cousin diagnosed with breast cancer in her 50s,  shortly after.     Mary’s parents are second cousins. Ashkenazi Quaker ancestry was denied. The patient is of [place]ian/[place]onian/  descent. The remainder of the family history was negative for intellectual disability, birth defects, miscarriages, stillbirths, blindness, deafness, kidney disease, heart disease, cancer, muscle disease, and blood disorders.     Assessment/Plan   Genetic Test Ordered: yes    Mary Zapata is a 58 year old woman with a family history of breast cancer and a reported known CHEK2 mutation found initially in her sister. Mary meets NCCN criteria for testing of the high risk breast and ovarian cancer genes based on her family history of a CHEK2 mutation.     While the familial CHEK2 mutation likely explains the extended family history of breast and ovarian cancer, not everyone who had cancer had genetic testing. It is possible that there is more than one mutation/pathogenic variant in the family which could explain the family history. We discussed performing single-site testing for the familial CHEK2 mutation. We also discussed the option of a larger hereditary cancer panel, such as the CancerNext panel (given the reasoning stated above). After discussion, Mary elected to proceed with the CancerNext panel from [facility]. Below is a summary of our discussion.    We reviewed genes and inherited forms of cancer in respect to the CHEK2 gene. We discussed that most cancers are not due to an inherited genetic susceptibility. However, in about 5-10% of families, such as yours, there is an inherited genetic mutation that can make a person more susceptible to developing certain forms of cancer. Within these families, we often see multiple family members with cancer, occurring in multiple generations. In addition, earlier onset and bilateral cancers are suggestive of an inherited form of cancer. There also tends to be a clustering of certain types of cancer in these families, such as breast and ovarian cancer.      We discussed that CHEK2 is a moderate risk breast cancer gene, meaning that the cancer risks are not as high as  some other genes (such as BRCA1 & BRCA2), so that most individuals with this mutation WILL NOT develop cancer due to the gene mutation itself. The lifetime risk of breast cancer in female heterozygotes is 20-40% (PMID: 61867485, [id number], [id number]). The general population risk to develop breast cancer is 10-12%. Contralateral breast cancer 10-year cumulative risk is 6-8%. Other cancers have also been associated with CHEK2 mutations, but there is limited data. Prostate, thyroid, and male breast cancer risk have also been suggested; however, specific risk estimates have not been determined. Women with a pathogenic CHEK2 mutation are recommended to start annual mammograms beginning at 40 years of age, with consideration of annual breast MRI with contrast every year starting at age 30-35 or 5-10 years before the earliest diagnosis of breast cancer in the family (whichever comes first).    Most of the genes associated with cancer predisposition syndromes, such as CHEK2 and BRCA1/2, are inherited in a dominant fashion. Dominant inheritance means that inheriting one copy of an altered CHEK2, BRCA1/2 gene significantly increases the risk for the development of cancer, and is therefore referred to as a dominant cancer susceptibility gene. Mary has a 50% chance of carrying the CHEK2 mutation that was identified in her sister. If Mary tests positive for the familial CHEK2 mutation, her children will be at a 50% chance of also having the familial CHEK2 mutation.     Mary was counseled about hereditary cancer susceptibility including cancer risks, options for increased screening and/or risk reduction, genetic testing, and the implications for other family members. Mary's other relatives with cancer have never had genetic testing, therefore, it is possible that there is more than one mutation/pathogenic variant in the family which could explain her family history. We discussed performing single-site testing for  "the familial CHEK2 mutation. We also discussed the option of performing genetic testing in the context of a multi-gene panel test that looks at the CHEK2 genes, as well as other genes associated with cancer risk (given the reasoning stated above). Mary is interested in this approach, so genetic testing will be ordered via the CancerNext panel from [facility].     We discussed the type of results that can be obtained with this testing:  The first is a positive result.  The next is a negative result.  The last result is a \"maybe\" which we call a variant of unknown significant, meaning that we found a change, but are not sure whether that change increases the risk for cancer or not.    Additionally, we discussed the Genetic Information Nondiscrimination Act (DON). DON prohibits discrimination by health insurance plans and employers based on one's genetic information. DON does not apply to life, long-term care or disability insurance. These insurers are allowed to use genetic, personal or family health information to make coverage or premium decisions. Some states have their own genetic protection laws, providing additional security against genetic discrimination for these types of insurance. In addition, DON does not apply to:  Members of the [facility]   Veterans obtaining health care through the 's Administration  Individuals using the  Health Service, or  Federal employees enrolled in the Federal Employees Health Benefits program (FEHB)    Mary will have her sister contact us to share her results so we can provide them to [facility]. Results are typically available within ~3-4 weeks.    PLAN:  Mary elected to undergo genetic testing via [facility]'s 39-gene CancerNext panel (includes the CHEK2 gene).  Consent for testing was obtained  We will send a blood collection kit to Mary once her sister sends us her genetic testing results.  Mary will be contacted via telemedicine on 12/10/24 " at 2:30 PM to discuss her testing results.  At that time, we will make recommendations for both Mary   and her family members in terms of cancer screening and/or cancer risk reduction options.  Please call [phone] with any questions.    Delfina Abernathy MS [facility] [place]  Licensed Genetic Counselor  [facility]  [phone]    Reviewed by:  Dr. Juanita Hernandez  Clinical   [facility]  [phone]    Total time spent on day of encounter: 27 minutes

## 2024-11-18 ENCOUNTER — TELEPHONE (OUTPATIENT)
Dept: GENETICS | Facility: CLINIC | Age: 59
End: 2024-11-18
Payer: COMMERCIAL

## 2024-11-18 NOTE — TELEPHONE ENCOUNTER
Mary sent in a copy of her sister's genetic testing results today, which showed a pathogenic CHEK2 mutation located at c.499G>A. We will [facility] ordering Mary's genetic testing to ensure the familial mutation is screened for.    Delfina Abernathy MS [facility] [place]  Licensed Genetic Counselor  [facility] [facility]  [phone]

## 2024-11-29 ENCOUNTER — LAB (OUTPATIENT)
Dept: LAB | Facility: LAB | Age: 59
End: 2024-11-29
Payer: COMMERCIAL

## 2024-11-29 DIAGNOSIS — Z80.9: Primary | ICD-10-CM

## 2024-11-29 DIAGNOSIS — I67.1 CEREBRAL ANEURYSM (HHS-HCC): ICD-10-CM

## 2024-11-29 LAB
ANION GAP SERPL CALC-SCNC: 14 MMOL/L (ref 10–20)
BUN SERPL-MCNC: 27 MG/DL (ref 6–23)
CALCIUM SERPL-MCNC: 9.4 MG/DL (ref 8.6–10.3)
CHLORIDE SERPL-SCNC: 102 MMOL/L (ref 98–107)
CO2 SERPL-SCNC: 28 MMOL/L (ref 21–32)
CREAT SERPL-MCNC: 0.94 MG/DL (ref 0.5–1.05)
EGFRCR SERPLBLD CKD-EPI 2021: 70 ML/MIN/1.73M*2
ERYTHROCYTE [DISTWIDTH] IN BLOOD BY AUTOMATED COUNT: 14.5 % (ref 11.5–14.5)
GLUCOSE SERPL-MCNC: 92 MG/DL (ref 74–99)
HCT VFR BLD AUTO: 37.1 % (ref 36–46)
HGB BLD-MCNC: 11.5 G/DL (ref 12–16)
INR PPP: 1.1 (ref 0.9–1.1)
MCH RBC QN AUTO: 24.7 PG (ref 26–34)
MCHC RBC AUTO-ENTMCNC: 31 G/DL (ref 32–36)
MCV RBC AUTO: 80 FL (ref 80–100)
NRBC BLD-RTO: 0 /100 WBCS (ref 0–0)
PLATELET # BLD AUTO: 314 X10*3/UL (ref 150–450)
POTASSIUM SERPL-SCNC: 3.6 MMOL/L (ref 3.5–5.3)
PROTHROMBIN TIME: 12.2 SECONDS (ref 9.8–12.8)
RBC # BLD AUTO: 4.66 X10*6/UL (ref 4–5.2)
SODIUM SERPL-SCNC: 140 MMOL/L (ref 136–145)
WBC # BLD AUTO: 9.6 X10*3/UL (ref 4.4–11.3)

## 2024-11-29 PROCEDURE — 85610 PROTHROMBIN TIME: CPT

## 2024-11-29 PROCEDURE — 80048 BASIC METABOLIC PNL TOTAL CA: CPT

## 2024-11-29 PROCEDURE — 36415 COLL VENOUS BLD VENIPUNCTURE: CPT

## 2024-11-29 PROCEDURE — 85027 COMPLETE CBC AUTOMATED: CPT

## 2024-12-02 ENCOUNTER — HOSPITAL ENCOUNTER (OUTPATIENT)
Dept: RADIOLOGY | Facility: HOSPITAL | Age: 59
Discharge: HOME | End: 2024-12-02
Payer: COMMERCIAL

## 2024-12-02 VITALS
OXYGEN SATURATION: 100 % | TEMPERATURE: 97.9 F | RESPIRATION RATE: 15 BRPM | HEART RATE: 70 BPM | SYSTOLIC BLOOD PRESSURE: 114 MMHG | DIASTOLIC BLOOD PRESSURE: 71 MMHG

## 2024-12-02 DIAGNOSIS — I67.1 CEREBRAL ANEURYSM (HHS-HCC): ICD-10-CM

## 2024-12-02 PROCEDURE — 99153 MOD SED SAME PHYS/QHP EA: CPT | Performed by: NEUROLOGICAL SURGERY

## 2024-12-02 PROCEDURE — 99152 MOD SED SAME PHYS/QHP 5/>YRS: CPT | Performed by: STUDENT IN AN ORGANIZED HEALTH CARE EDUCATION/TRAINING PROGRAM

## 2024-12-02 PROCEDURE — 36224 PLACE CATH CAROTD ART: CPT | Mod: 50 | Performed by: NEUROLOGICAL SURGERY

## 2024-12-02 PROCEDURE — 36226 PLACE CATH VERTEBRAL ART: CPT | Performed by: NEUROLOGICAL SURGERY

## 2024-12-02 PROCEDURE — C1769 GUIDE WIRE: HCPCS | Performed by: STUDENT IN AN ORGANIZED HEALTH CARE EDUCATION/TRAINING PROGRAM

## 2024-12-02 PROCEDURE — 99153 MOD SED SAME PHYS/QHP EA: CPT | Performed by: STUDENT IN AN ORGANIZED HEALTH CARE EDUCATION/TRAINING PROGRAM

## 2024-12-02 PROCEDURE — 2780000003 HC OR 278 NO HCPCS: Performed by: STUDENT IN AN ORGANIZED HEALTH CARE EDUCATION/TRAINING PROGRAM

## 2024-12-02 PROCEDURE — C1760 CLOSURE DEV, VASC: HCPCS | Performed by: STUDENT IN AN ORGANIZED HEALTH CARE EDUCATION/TRAINING PROGRAM

## 2024-12-02 PROCEDURE — 99152 MOD SED SAME PHYS/QHP 5/>YRS: CPT | Performed by: NEUROLOGICAL SURGERY

## 2024-12-02 PROCEDURE — 7100000010 HC PHASE TWO TIME - EACH INCREMENTAL 1 MINUTE: Performed by: STUDENT IN AN ORGANIZED HEALTH CARE EDUCATION/TRAINING PROGRAM

## 2024-12-02 PROCEDURE — 76377 3D RENDER W/INTRP POSTPROCES: CPT | Performed by: NEUROLOGICAL SURGERY

## 2024-12-02 PROCEDURE — 2500000004 HC RX 250 GENERAL PHARMACY W/ HCPCS (ALT 636 FOR OP/ED): Performed by: NEUROLOGICAL SURGERY

## 2024-12-02 PROCEDURE — 7100000009 HC PHASE TWO TIME - INITIAL BASE CHARGE: Performed by: STUDENT IN AN ORGANIZED HEALTH CARE EDUCATION/TRAINING PROGRAM

## 2024-12-02 PROCEDURE — 76376 3D RENDER W/INTRP POSTPROCES: CPT | Performed by: NEUROLOGICAL SURGERY

## 2024-12-02 PROCEDURE — 2720000007 HC OR 272 NO HCPCS: Performed by: STUDENT IN AN ORGANIZED HEALTH CARE EDUCATION/TRAINING PROGRAM

## 2024-12-02 PROCEDURE — 75710 ARTERY X-RAYS ARM/LEG: CPT | Mod: RT | Performed by: NEUROLOGICAL SURGERY

## 2024-12-02 PROCEDURE — 36224 PLACE CATH CAROTD ART: CPT | Performed by: NEUROLOGICAL SURGERY

## 2024-12-02 PROCEDURE — 2550000001 HC RX 255 CONTRASTS: Performed by: NEUROLOGICAL SURGERY

## 2024-12-02 RX ORDER — FENTANYL CITRATE 50 UG/ML
INJECTION, SOLUTION INTRAMUSCULAR; INTRAVENOUS
Status: COMPLETED | OUTPATIENT
Start: 2024-12-02 | End: 2024-12-02

## 2024-12-02 RX ORDER — MIDAZOLAM HYDROCHLORIDE 1 MG/ML
INJECTION INTRAMUSCULAR; INTRAVENOUS
Status: COMPLETED | OUTPATIENT
Start: 2024-12-02 | End: 2024-12-02

## 2024-12-02 ASSESSMENT — PAIN - FUNCTIONAL ASSESSMENT

## 2024-12-02 ASSESSMENT — PAIN SCALES - GENERAL

## 2024-12-02 NOTE — PROCEDURES
Pre-Procedure Verification and Time Out:  Procedure Location: procedure area  HUDDLE - Pre-procedure Verification:  completed  TIME OUT - Final Verification:  completed immediately prior to procedure start  DEBRIEF: completed    Complications:  None; patient tolerated the procedure well.     Disposition: rPCU  Condition: stable  Specimens Collected: No specimens collected    General Information:   Anesthesia/ sedation: Non-Anesthesia  Indication(s)/Pre - Procedure Diagnoses: Cerebral Aneurysm   Post-Procedure Diagnosis: Cerebral Aneurysm   Procedure Name: Diagnostic Cerebral Angiogram  Procedure performed by: Jeremy Faust   Assistant(s): Chel   Estimated Blood Loss (mL): 10  Specimen: no  Informed Consent: consent obtained and in chart     Access: 5 Fr Sheath in R CFA  Closure: Mynx  Vessels Injected: R ICA, R VA, L ICA, R CFA   Moderate Sedation Time: 30 min   Findings: no residual aneurysm filling, Full report to follow in PACS dictation

## 2024-12-02 NOTE — PRE-PROCEDURE NOTE
Pre-Procedure H&P     Provider Assessment:  Diagnosis/Reason for Procedure: Cerebral Aneurysm   Procedure: Diagnostic Cerebral Angiogram  Medications Reviewed:   yes   Prophylatic Antibiotics Needed:   no    Neuro status: A&Ox3, moving all extremities full strength   Mouth Opening OK: yes   Neck Flexibility OK: yes   Sedation Plan: moderate sedation   COVID-19 Risk Consent:  Surgeon has reviewed key risks related to the risk of chiki COVID-19 and if they contract COVID-19 what the risks are.

## 2024-12-02 NOTE — DISCHARGE INSTRUCTIONS
You received moderate sedation:  - Do not drive a car, or operate any machinery or power tools of any kind.  - Do not drink any alcoholic drinks.  - Do not take any over the counter medications that may cause drowsiness.  - Do not make any important decisions or sign any legal documents.  - You need to have a responsible adult accompany you home.  - You may resume your normal diet.  - We strongly suggest that a responsible adult be with you for the rest of the day and also during the night. This is for your protection and safety.     For questions related to your procedure:  Please call [phone] between the hours of 7:00am-5:00pm Monday through Friday.  Please call [phone] after 5:00pm and on weekends and holidays.     In the event of an emergency call 911 or go to your nearest emergency room.

## 2024-12-02 NOTE — Clinical Note
Procedure complete. Pt tolerated well with 1mg versed and 50mcg fentanyl IVP. Access via right groin. Closure via 5 fr mynx. Hemostasis achieved. 2x2 and tegaderm dressing applied. Dressing clean, dry, and intact. No hematoma. Pt to keep right leg straight for 3 hours post deployment time. VSS. Pt transferred to [facility], report given to RN.

## 2024-12-10 ENCOUNTER — TELEPHONE (OUTPATIENT)
Dept: GENETICS | Facility: CLINIC | Age: 59
End: 2024-12-10

## 2024-12-10 ENCOUNTER — APPOINTMENT (OUTPATIENT)
Dept: GENETICS | Facility: CLINIC | Age: 59
End: 2024-12-10
Payer: COMMERCIAL

## 2024-12-17 LAB
COMMENTS - MP RESULT TYPE: NORMAL
SCAN RESULT: NORMAL

## 2024-12-31 ENCOUNTER — APPOINTMENT (OUTPATIENT)
Dept: GENETICS | Facility: CLINIC | Age: 59
End: 2024-12-31
Payer: COMMERCIAL

## 2024-12-31 DIAGNOSIS — Z15.89 CHEK2 GENE MUTATION POSITIVE: ICD-10-CM

## 2024-12-31 PROCEDURE — 96040 PR MEDICAL GENETICS COUNSELING EACH 30 MINUTES: CPT | Performed by: GENETIC COUNSELOR, MS

## 2024-12-31 NOTE — PROGRESS NOTES
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 Mary was initially seen virtually in the Cancer Genetics Clinic on 11-12-24 for counseling and coordination of testing. Based on her family history, the 39-gene CancerNext panel through [facility] was ordered. Mary was seen today for a virtual follow-up appointment to review the results of this testing.    We discussed that her genetic test results identified a HETEROZYGOUS LIKELY PATHOGENIC MUTATION (also called harmful changes) in the CHEK2 gene located at p.G167R (c.499G>A). This variant is consistent with a predisposition to, or diagnosis of, CHEK2 related conditions.         The lifetime risk of breast cancer in female heterozygotes is 23-27%. Contralateral breast cancer 10-year cumulative risk is 6-8%. Previous studies have also suggested that both men and women with the CHEK2 mutations are at increased risk for colorectal cancer. More recent studies have stated that there is NO increased risk for colorectal cancer for those with a CHEK2 mutation. General population screening is appropriate for these individuals. Other cancers have also been associated with CHEK2 mutations, but there is limited data. Prostate, thyroid, and male breast cancer risk have also been suggested; however, specific risk estimates have not been determined.     There may be additional cancer risks that we are unaware of today, as our understanding about the cancers associated with CHEK2  mutations is still growing.    Current screening guidelines from the National Comprehensive Cancer Network (NCCN) for those with a pathogenic CHEK2 variant are as follows:  Breast Cancer Screening:  Annual mammogram beginning at 40 years of age, with consideration of annual breast MRI with contrast every year starting at age 30-35 or 5-10 years before the earliest diagnosis of breast cancer in the family (whichever comes first).  There  is insufficient evidence of risk-reducing mastectomy for carriers of a pathogenic CHEK2 variants, but this procedure may be considered based on family history.  We will refer Mary to the high risk breast clinic to discuss her screening options further.    We reviewed the chance that Mary's other relatives could also have inherited the CHEK2 mutation. Each of her first-degree relatives have a 1 in 2 (50% chance) to also have inherited this mutation. This includes her children.    Mary was also encouraged to follow with her primary care provider(s) for any other age-related cancer screenings, such as Pap smears, colonoscopies, etc.    PLAN:  A copy of Mary's genetic test report will be mailed to her and a copy was uploaded to her chart.  We will mail Mary a letter to share with her family members explaining her test results and how family members can go about getting genetic counseling/testing for themselves.  Mary is welcome to contact me at any point with questions about her results.  I recommend that she contact me every 3-4 years to see if there have been any changes to our discussion today.    Delfina Abernathy MS [facility] [place]  Licensed Genetic Counselor  [place] [facility]  [phone]

## 2025-02-11 NOTE — PROGRESS NOTES
Chief Complaint  New patient, high risk evaluation, CHEK2    History Of Present Illness  Mary Zapata is a 59 y.o. female presenting for a high risk breast cancer evaluation. 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 2019 left breast benign core biopsy. She denies breast biopsy or surgery. She has family history breast cancer in       BREAST IMAGING:       REPRODUCTIVE HISTORY: menarche age, , first birth age 29,  6-8 months, OCP's 10 years, menopause age 49, breast tissue          FAMILY CANCER HISTORY:   Sister: Breast cancer age 55, CHEK2+  Paternal cousin: Breast cancer age 45  Paternal 2nd cousin: Breast cancer age 50s    Review of Systems  Constitutional:  Negative for appetite change, fatigue, fever and unexpected weight change.   HENT:  Negative for ear pain, hearing loss, nosebleeds, sore throat and trouble swallowing.    Eyes:  Negative for discharge, itching and visual disturbance.   Breast: As stated in HPI.  Respiratory:  Negative for cough, chest tightness and shortness of breath.    Cardiovascular:  Negative for chest pain, palpitations and leg swelling.   Gastrointestinal:  Negative for abdominal pain, constipation, diarrhea and nausea.   Endocrine: Negative for cold intolerance and heat intolerance.   Genitourinary:  Negative for dysuria, frequency, hematuria, pelvic pain and vaginal bleeding.   Musculoskeletal:  Negative for arthralgias, back pain, gait problem, joint swelling and myalgias.   Skin:  Negative for color change and rash.   Allergic/Immunologic: Negative for environmental allergies and food allergies.   Neurological:  Negative for dizziness, tremors, speech difficulty, weakness, numbness and headaches.   Hematological:  Does not bruise/bleed easily.   Psychiatric/Behavioral:  Negative for agitation, dysphoric mood and sleep disturbance. The patient is not nervous/anxious.       Surgical History  She has a past surgical history that includes MR angio head w and wo IV contrast  (2023); MR angio head w and wo IV contrast (2023); CT angio neck (2022); CT angio head w and wo IV contrast (2022); IR angiogram cerebral bilateral (Bilateral, 10/09/2023); Appendectomy; and  section, classic.     Social History  She reports that she has never smoked. She has never used smokeless tobacco. She reports that she does not currently use alcohol. She reports that she does not use drugs.    Family History  Family History   Problem Relation Name Age of Onset    Hyperlipidemia Mother      Hypertension Mother          Allergies  Azilsartan, Azilsartan med-chlorthalidone, and Furosemide    Past Medical History  She has a past medical history of Anxiety, Cerebral aneurysm (HHS-HCC), Hypertension, and PONV (postoperative nausea and vomiting).     Physical Exam  Patient is alert and oriented x3 and in a relaxed and appropriate mood. Her gait is steady and hand grasps are equal. Sclera is clear. The breasts are nearly symmetrical. The tissue is soft without palpable abnormalities, discrete nodules or masses. The skin and nipples appear normal. There is no cervical, supraclavicular or axillary lymphadenopathy. Heart rate and rhythm normal, S1 and S2 appreciated. The lungs are clear to auscultation bilaterally. Abdomen is soft and non-tender.      Last Recorded Vitals  There were no vitals taken for this visit.    Relevant Results and Imaging  No results found for this or any previous visit from the past 365 days.        Provider Impressions    Risk Profile      Patient Discussion/Summary  Your clinical examination and imaging are normal. Please return in one year for mammogram and office visit or sooner if you have any problems or concerns.     High risk breast surveillance care plan:  Yearly mammogram with digital breast tomosynthesis  Twice yearly clinical breast examinations  Breast MRI-  Monthly self breast examinations &/or regular self breast awareness  Vitamin D3 2000 IU/daily  (over the counter) unless your PCP recommends you take a specific dose  Exercise 3-4 times per week for 45-60 minutes  Limit alcohol to 3-4 drinks per week if you are menopausal  Eat healthy low-fat diet with lots of vegetable & fruits    Risk model indicates you are eligible for endocrine therapy with Tamoxifen, Raloxifene or Aromatase inhibitor. Endocrine therapy reduces lifetime risk of breast cancer by up to 50% when taken for 5 years. There is an alternative low dose Tamoxifen which can be taken for only 3 years.      ***IMPORTANT INFORMATION REGARDING YOUR RESULTS***    If you receive medical information from My [facility] Personal Health Record (online chart) your results will be released into your chart. This means you may view or see results of your biopsy or procedure before I contact you directly. If this occurs, please call the office and we will discuss your results over the phone.     You can see your health information, review clinical summaries from office visits & test results online when you follow your health with MY  Chart, a personal health record. To sign up go to [email or web address]. If you need assistance with signing up or trouble getting into your account call [facility] Patient Line 24/7 at [phone].    My office phone number is [phone] if you need to get in touch with me or have additional questions or concerns. Thank you for choosing [facility] and trusting me as your healthcare provider. I look forward to seeing you again at your next office visit. I am honored to be a provider on your health care team and I remain dedicated to helping you achieve your health goals.    Fabiana Oconnor, APRN-CNP

## 2025-02-13 ENCOUNTER — HOSPITAL ENCOUNTER (OUTPATIENT)
Dept: RADIOLOGY | Facility: EXTERNAL LOCATION | Age: 60
Discharge: HOME | End: 2025-02-13

## 2025-02-18 ENCOUNTER — APPOINTMENT (OUTPATIENT)
Dept: SURGICAL ONCOLOGY | Facility: CLINIC | Age: 60
End: 2025-02-18
Payer: COMMERCIAL

## 2025-03-27 NOTE — PROGRESS NOTES
Chief Complaint  New patient, High risk breast cancer evaluation, CHEK2+    History Of Present Illness  Mary Zapata is a very pleasant 59 y.o. female presenting for a high risk breast cancer evaluation. PMH of intracranial aneurysm s/p craniotomy with titanium plates. 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 History right and left breast benign core biopsy. She denies breast surgery. She has family history breast cancer, see below.        BREAST IMAGIN2025 Bilateral screening mammogram, BI-RADS Category   Pending. No Breast MRIs performed      REPRODUCTIVE HISTORY: menarche age 9, , first birth age 29,  8 months, OCP's, menopausal age 44         FAMILY CANCER HISTORY:   Sister: Breast cancer age 55, CHEK2+  Paternal cousin: Breast cancer age 40s  Paternal second cousin: Breast cancer age 50s    Review of Systems  Constitutional:  Negative for appetite change, fatigue, fever and unexpected weight change.   HENT:  Negative for ear pain, hearing loss, nosebleeds, sore throat and trouble swallowing.    Eyes:  Negative for discharge, itching and visual disturbance.   Breast: As stated in HPI.  Respiratory:  Negative for cough, chest tightness and shortness of breath.    Cardiovascular:  Negative for chest pain, palpitations and leg swelling.   Gastrointestinal:  Negative for abdominal pain, constipation, diarrhea and nausea.   Endocrine: Negative for cold intolerance and heat intolerance.   Genitourinary:  Negative for dysuria, frequency, hematuria, pelvic pain and vaginal bleeding.   Musculoskeletal:  Negative for arthralgias, back pain, gait problem, joint swelling and myalgias.   Skin:  Negative for color change and rash.   Allergic/Immunologic: Negative for environmental allergies and food allergies.   Neurological:  Negative for dizziness, tremors, speech difficulty, weakness, numbness and headaches.   Hematological:  Does not bruise/bleed easily.    Psychiatric/Behavioral:  Negative for agitation, dysphoric mood and sleep disturbance. The patient is not nervous/anxious.       Surgical History  She has a past surgical history that includes MR angio head w and wo IV contrast (2023); MR angio head w and wo IV contrast (2023); CT angio neck (2022); CT angio head w and wo IV contrast (2022); IR angiogram cerebral bilateral (Bilateral, 10/09/2023); Appendectomy;  section, classic; and Colonoscopy ().     Social History  She reports that she has never smoked. She has never used smokeless tobacco. She reports that she does not currently use alcohol. She reports that she does not use drugs.    Family History  Family History   Problem Relation Name Age of Onset    Hyperlipidemia Mother Yvette michaud     Hypertension Mother Yvette michaud     Hearing loss Mother Yvette michaud     Hypertension Father Nigel wells. ()     Heart disease Father Nigel wells. ()     Breast cancer Sister Angela menon     Asthma Sister Angela menon     Breast cancer Sister Angela vardamounika     Asthma Sister Angela menon         Allergies  Azilsartan, Azilsartan med-chlorthalidone, and Furosemide    Past Medical History  She has a past medical history of Anxiety, Cerebral aneurysm (HHS-HCC), Hypertension, and PONV (postoperative nausea and vomiting).     Physical Exam  Patient is alert and oriented x3 and in a relaxed and appropriate mood. Her gait is steady and hand grasps are equal. Sclera is clear. The breasts are nearly symmetrical. The tissue is soft without palpable abnormalities, discrete nodules or masses. The skin and nipples appear normal. There is no cervical, supraclavicular or axillary lymphadenopathy. Heart rate and rhythm normal, S1 and S2 appreciated. The lungs are clear to auscultation bilaterally. Abdomen is soft and non-tender.      Last Recorded Vitals  Blood pressure 127/81, pulse 75, temperature 36.4 °C  "(97.5 °F), temperature source Temporal, resp. rate 20, height 1.626 m (5' 4.02\"), weight 98.9 kg (218 lb 0.6 oz), SpO2 95%.    Relevant Results and Imaging          Provider Impressions    Normal clinical breast exam, high risk breast cancer surveillance, CHEK2+, benign breast biopsy, family history breast cancer.        Risk Profile          Patient Discussion/Summary  Your clinical examination is normal. Your bilateral screening mammogram is pending. I will call you with the results if abnormal. Please return in one year for mammogram and office visit or sooner if you have any problems or concerns.     High risk breast surveillance care plan:  Yearly mammogram with digital breast tomosynthesis  Twice yearly clinical breast examinations  Breast MRI-due November 2025  Monthly self breast examinations &/or regular self breast awareness  Vitamin D3 2000 IU/daily (over the counter) unless your PCP recommends you take a specific dose  Exercise 3-4 times per week for 45-60 minutes  Limit alcohol to 3-4 drinks per week if you are menopausal  Eat healthy low-fat diet with lots of vegetable & fruits    Risk model indicates you are eligible for endocrine therapy with Tamoxifen, Raloxifene or Aromatase inhibitor. Endocrine therapy reduces lifetime risk of breast cancer by up to 50% when taken for 5 years. There is an alternative low dose Tamoxifen which can be taken for only 3 years.      IMPORTANT INFORMATION REGARDING YOUR RESULTS    If you receive medical information from My [facility] Personal Health Record (online chart) your results will be released into your chart. This means you may view or see results of your biopsy or procedure before I contact you directly. If this occurs, please call the office and we will discuss your results over the phone.     You can see your health information, review clinical summaries from office visits & test results online when you follow your health with MY [facility], a personal health " record. To sign up go to [email or web address]. If you need assistance with signing up or trouble getting into your account call [facility] Patient Line 24/7 at [phone].    My office phone number is [phone] if you need to get in touch with me or have additional questions or concerns. Thank you for choosing [facility] and trusting me as your healthcare provider. I look forward to seeing you again at your next office visit. I am honored to be a provider on your health care team and I remain dedicated to helping you achieve your health goals.    Fabiana Oconnor, APRN-CNP

## 2025-03-28 DIAGNOSIS — Z12.39 BREAST CANCER SCREENING, HIGH RISK PATIENT: ICD-10-CM

## 2025-04-04 ENCOUNTER — HOSPITAL ENCOUNTER (OUTPATIENT)
Dept: RADIOLOGY | Facility: HOSPITAL | Age: 60
Discharge: HOME | End: 2025-04-04
Payer: COMMERCIAL

## 2025-04-04 ENCOUNTER — OFFICE VISIT (OUTPATIENT)
Dept: SURGICAL ONCOLOGY | Facility: HOSPITAL | Age: 60
End: 2025-04-04
Payer: COMMERCIAL

## 2025-04-04 VITALS
RESPIRATION RATE: 20 BRPM | HEART RATE: 75 BPM | TEMPERATURE: 97.5 F | DIASTOLIC BLOOD PRESSURE: 81 MMHG | SYSTOLIC BLOOD PRESSURE: 127 MMHG | HEIGHT: 64 IN | WEIGHT: 218.03 LBS | OXYGEN SATURATION: 95 % | BODY MASS INDEX: 37.22 KG/M2

## 2025-04-04 DIAGNOSIS — Z80.3 FAMILY HISTORY OF BREAST CANCER: Primary | ICD-10-CM

## 2025-04-04 DIAGNOSIS — Z12.39 BREAST CANCER SCREENING, HIGH RISK PATIENT: ICD-10-CM

## 2025-04-04 DIAGNOSIS — Z15.89 CHEK2 GENE MUTATION POSITIVE: ICD-10-CM

## 2025-04-04 PROCEDURE — 77063 BREAST TOMOSYNTHESIS BI: CPT

## 2025-04-04 PROCEDURE — 1036F TOBACCO NON-USER: CPT

## 2025-04-04 PROCEDURE — 99215 OFFICE O/P EST HI 40 MIN: CPT

## 2025-04-04 PROCEDURE — 99205 OFFICE O/P NEW HI 60 MIN: CPT

## 2025-04-04 PROCEDURE — 3074F SYST BP LT 130 MM HG: CPT

## 2025-04-04 PROCEDURE — 3079F DIAST BP 80-89 MM HG: CPT

## 2025-04-04 PROCEDURE — 3008F BODY MASS INDEX DOCD: CPT

## 2025-04-04 ASSESSMENT — PAIN SCALES - GENERAL: PAINLEVEL_OUTOF10: 0-NO PAIN

## 2025-04-04 ASSESSMENT — PATIENT HEALTH QUESTIONNAIRE - PHQ9
10. IF YOU CHECKED OFF ANY PROBLEMS, HOW DIFFICULT HAVE THESE PROBLEMS MADE IT FOR YOU TO DO YOUR WORK, TAKE CARE OF THINGS AT HOME, OR GET ALONG WITH OTHER PEOPLE: NOT DIFFICULT AT ALL
2. FEELING DOWN, DEPRESSED OR HOPELESS: SEVERAL DAYS
SUM OF ALL RESPONSES TO PHQ9 QUESTIONS 1 & 2: 1
1. LITTLE INTEREST OR PLEASURE IN DOING THINGS: NOT AT ALL

## 2025-04-04 NOTE — PATIENT INSTRUCTIONS
Your clinical examination is normal. Your bilateral screening mammogram is pending. I will call you with the results if abnormal. Please return in one year for mammogram and office visit or sooner if you have any problems or concerns.     High risk breast surveillance care plan:  Yearly mammogram with digital breast tomosynthesis  Twice yearly clinical breast examinations  Breast MRI-due November 2025  Monthly self breast examinations &/or regular self breast awareness  Vitamin D3 2000 IU/daily (over the counter) unless your PCP recommends you take a specific dose  Exercise 3-4 times per week for 45-60 minutes  Limit alcohol to 3-4 drinks per week if you are menopausal  Eat healthy low-fat diet with lots of vegetable & fruits    Risk model indicates you are eligible for endocrine therapy with Tamoxifen, Raloxifene or Aromatase inhibitor. Endocrine therapy reduces lifetime risk of breast cancer by up to 50% when taken for 5 years. There is an alternative low dose Tamoxifen which can be taken for only 3 years.      IMPORTANT INFORMATION REGARDING YOUR RESULTS    If you receive medical information from My [facility] Personal Health Record (online chart) your results will be released into your chart. This means you may view or see results of your biopsy or procedure before I contact you directly. If this occurs, please call the office and we will discuss your results over the phone.     You can see your health information, review clinical summaries from office visits & test results online when you follow your health with MY  Chart, a personal health record. To sign up go to [email or web address]. If you need assistance with signing up or trouble getting into your account call [facility] Patient Line 24/7 at [phone].    My office phone number is [phone] if you need to get in touch with me or have additional questions or concerns. Thank you for choosing [facility] and trusting me as your healthcare  provider. I look forward to seeing you again at your next office visit. I am honored to be a provider on your health care team and I remain dedicated to helping you achieve your health goals.

## 2025-10-16 ENCOUNTER — APPOINTMENT (OUTPATIENT)
Dept: SURGICAL ONCOLOGY | Facility: HOSPITAL | Age: 60
End: 2025-10-16
Payer: COMMERCIAL

## (undated) DEVICE — EVACUATOR, WOUND, SUCTION, CLOSED, JACKSON-PRATT, 100 CC, SILICONE

## (undated) DEVICE — COVER, CART, 45 X 27 X 48 IN, CLEAR

## (undated) DEVICE — COTTON BALL, DOUBLE STRING, SMALL

## (undated) DEVICE — MANIFOLD, 4 PORT NEPTUNE STANDARD

## (undated) DEVICE — REST, HEAD, BAGEL, 9 IN

## (undated) DEVICE — SUTURE, POLYSORB, 2-0, 18 IN, GS23, DETACHABLE, MULTIPACK, UNDYED

## (undated) DEVICE — TUBING, SUCTION, MICROSURGICAL, MICROVAC, 5 FR

## (undated) DEVICE — ELECTRODE, CORKSCREW NEEDLE 1.5M LENGTH

## (undated) DEVICE — SUTURE, NUROLON, 4-0, 18 IN, TF, CONTROL RELEASE, MULTIPACK, BLACK

## (undated) DEVICE — Device

## (undated) DEVICE — TAPE, SILK, DURAPORE, 3 IN X 10 YD, LF

## (undated) DEVICE — CATHETER TRAY, SURESTEP, 16FR, URINE METER W/STATLOCK

## (undated) DEVICE — NEEDLE, ELECTRODE, SUBDERMAL, PAIRED, 2.0 LEAD, DISP

## (undated) DEVICE — DRESSING, ADAPTIC, NON-ADHERENT, 3 X 8 IN

## (undated) DEVICE — BALL, COTTON, MEDIUM, STERILE

## (undated) DEVICE — RETRACTOR, HOOK, FISH, NEURO

## (undated) DEVICE — LOOP, VESSEL, MAXI, RED

## (undated) DEVICE — GLOVE, SURGICAL, PROTEXIS PI BLUE W/NEUTHERA, 8.0, PF, LF

## (undated) DEVICE — DRAIN, WOUND, ROUND, W/TROCAR, HOLE PATTERN, 10 IN, MEDIUM, 1/8 X 49 IN

## (undated) DEVICE — SUTURE, SILK, 2-0, 18 IN, BLACK

## (undated) DEVICE — COTTON BALL, LARGE, STERILE

## (undated) DEVICE — ELECTRODE, GROUND PLATE

## (undated) DEVICE — GEL, ULTRASOUND, AQUASONIC 100, 20 GM, STERILE

## (undated) DEVICE — GOWN, SURGICAL, SMARTGOWN, XLARGE, STERILE

## (undated) DEVICE — SUTURE, PROLENE, 3-0, 18 IN, PS2, BLUE

## (undated) DEVICE — MARKER, SKIN, RULER AND LABEL PACK, CUSTOM

## (undated) DEVICE — BUR, ACORN 6MM PRECISION

## (undated) DEVICE — BUR, PERFORATOR, CRANIAL, ACRA-CUT 14/11MM, CDM

## (undated) DEVICE — DRESSING, TRANSPARENT, TEGADERM, 2-3/8 X 2-3/4 IN

## (undated) DEVICE — DRESSING, GAUZE, PETROLATUM, PATCH, XEROFORM, 1 X 8 IN, STERILE

## (undated) DEVICE — BALL, COTTON, STANDARD, STERILE

## (undated) DEVICE — TRAY, MINOR, SINGLE BASIN, STERILE

## (undated) DEVICE — CANNULA, 27G X 22MM, ANTERIOR, CHAMBER, RYCROFT

## (undated) DEVICE — APPLICATOR, PREP, CHLORAPREP, W/ORANGE TINT, 10.5ML

## (undated) DEVICE — DRAPE, MICROSCOPE, FOR ZEISS 65MM, VARI-LENS II, 52 X 150

## (undated) DEVICE — GLOVE, SURGICAL, PROTEXIS PI ORTHO, 7.5, PF, LF

## (undated) DEVICE — SEALANT, HEMOSTATIC, FLOSEAL, 10 ML

## (undated) DEVICE — BLADE, SAW, OSCILLATING/SAGITTAL, 9 X 18.5 X 0.51 MM, STERILE

## (undated) DEVICE — COUNTER, NEEDLE, FOAM BLOCK, W/MAGNET, W/BLADE GUARD, 10 COUNT, RED, LF

## (undated) DEVICE — BOWL, BASIN, 32 OZ, STERILE

## (undated) DEVICE — DRAPE PACK, CRANIOTOMY, CUSTOM, UHC